# Patient Record
Sex: FEMALE | Race: BLACK OR AFRICAN AMERICAN | NOT HISPANIC OR LATINO | Employment: PART TIME | URBAN - METROPOLITAN AREA
[De-identification: names, ages, dates, MRNs, and addresses within clinical notes are randomized per-mention and may not be internally consistent; named-entity substitution may affect disease eponyms.]

---

## 2023-11-09 ENCOUNTER — OFFICE VISIT (OUTPATIENT)
Dept: PRIMARY CARE | Facility: CLINIC | Age: 33
End: 2023-11-09
Payer: MEDICAID

## 2023-11-09 VITALS
DIASTOLIC BLOOD PRESSURE: 70 MMHG | BODY MASS INDEX: 24.1 KG/M2 | SYSTOLIC BLOOD PRESSURE: 110 MMHG | WEIGHT: 136 LBS | TEMPERATURE: 97.8 F | OXYGEN SATURATION: 100 % | HEART RATE: 77 BPM | HEIGHT: 63 IN

## 2023-11-09 RX ORDER — HYDROCHLOROTHIAZIDE 25 MG/1
TABLET ORAL EVERY 24 HOURS
COMMUNITY
Start: 2015-03-03 | End: 2023-11-10 | Stop reason: SDUPTHER

## 2023-11-09 ASSESSMENT — PATIENT HEALTH QUESTIONNAIRE - PHQ9
1. LITTLE INTEREST OR PLEASURE IN DOING THINGS: NOT AT ALL
SUM OF ALL RESPONSES TO PHQ9 QUESTIONS 1 AND 2: 0
2. FEELING DOWN, DEPRESSED OR HOPELESS: NOT AT ALL

## 2023-11-09 ASSESSMENT — ENCOUNTER SYMPTOMS
DEPRESSION: 0
OCCASIONAL FEELINGS OF UNSTEADINESS: 0
LOSS OF SENSATION IN FEET: 0

## 2023-11-09 ASSESSMENT — PAIN SCALES - GENERAL: PAINLEVEL: 0-NO PAIN

## 2023-11-09 NOTE — PROGRESS NOTES
"Subjective   Patient ID: Mayuri Calhoun is a 33 y.o. female who presents for Establish Care.    HPI     Review of Systems    Objective   /70 (BP Location: Left arm, Patient Position: Sitting, BP Cuff Size: Small adult)   Pulse 77   Temp 36.6 °C (97.8 °F) (Oral)   Ht 1.6 m (5' 3\")   Wt 61.7 kg (136 lb)   LMP 10/10/2023 (Approximate)   SpO2 100%   BMI 24.09 kg/m²     Physical Exam    Assessment/Plan   {Assess/PlanSmartLinks:36556}       "

## 2023-11-10 ENCOUNTER — TELEPHONE (OUTPATIENT)
Dept: PRIMARY CARE | Facility: CLINIC | Age: 33
End: 2023-11-10
Payer: MEDICAID

## 2023-11-10 DIAGNOSIS — Z00.00 ROUTINE GENERAL MEDICAL EXAMINATION AT A HEALTH CARE FACILITY: ICD-10-CM

## 2023-11-10 DIAGNOSIS — I10 PRIMARY HYPERTENSION: Primary | ICD-10-CM

## 2023-11-10 RX ORDER — HYDROCHLOROTHIAZIDE 25 MG/1
25 TABLET ORAL EVERY 24 HOURS
Qty: 30 TABLET | Refills: 0 | Status: SHIPPED | OUTPATIENT
Start: 2023-11-10 | End: 2023-12-05

## 2023-12-05 DIAGNOSIS — Z00.00 ROUTINE GENERAL MEDICAL EXAMINATION AT A HEALTH CARE FACILITY: ICD-10-CM

## 2023-12-05 RX ORDER — HYDROCHLOROTHIAZIDE 25 MG/1
25 TABLET ORAL EVERY 24 HOURS
Qty: 20 TABLET | Refills: 0 | Status: SHIPPED | OUTPATIENT
Start: 2023-12-05 | End: 2024-01-30 | Stop reason: SDUPTHER

## 2023-12-19 ENCOUNTER — OFFICE VISIT (OUTPATIENT)
Dept: PRIMARY CARE | Facility: CLINIC | Age: 33
End: 2023-12-19
Payer: MEDICAID

## 2023-12-19 VITALS
BODY MASS INDEX: 23.49 KG/M2 | HEART RATE: 85 BPM | OXYGEN SATURATION: 99 % | SYSTOLIC BLOOD PRESSURE: 112 MMHG | DIASTOLIC BLOOD PRESSURE: 68 MMHG | WEIGHT: 132.6 LBS

## 2023-12-19 DIAGNOSIS — R63.4 WEIGHT LOSS: Primary | ICD-10-CM

## 2023-12-19 DIAGNOSIS — I10 PRIMARY HYPERTENSION: ICD-10-CM

## 2023-12-19 DIAGNOSIS — F20.9 SCHIZOPHRENIA, UNSPECIFIED TYPE (MULTI): ICD-10-CM

## 2023-12-19 PROCEDURE — 3078F DIAST BP <80 MM HG: CPT | Performed by: FAMILY MEDICINE

## 2023-12-19 PROCEDURE — 3074F SYST BP LT 130 MM HG: CPT | Performed by: FAMILY MEDICINE

## 2023-12-19 PROCEDURE — 3008F BODY MASS INDEX DOCD: CPT | Performed by: FAMILY MEDICINE

## 2023-12-19 PROCEDURE — 99214 OFFICE O/P EST MOD 30 MIN: CPT | Performed by: FAMILY MEDICINE

## 2023-12-19 RX ORDER — DIVALPROEX SODIUM 250 MG/1
250 TABLET, FILM COATED, EXTENDED RELEASE ORAL DAILY
COMMUNITY
Start: 2023-12-18 | End: 2024-03-05

## 2023-12-19 ASSESSMENT — PATIENT HEALTH QUESTIONNAIRE - PHQ9
2. FEELING DOWN, DEPRESSED OR HOPELESS: NOT AT ALL
SUM OF ALL RESPONSES TO PHQ9 QUESTIONS 1 AND 2: 0
1. LITTLE INTEREST OR PLEASURE IN DOING THINGS: NOT AT ALL

## 2023-12-19 ASSESSMENT — ENCOUNTER SYMPTOMS
LOSS OF SENSATION IN FEET: 0
DEPRESSION: 0
OCCASIONAL FEELINGS OF UNSTEADINESS: 0

## 2023-12-19 NOTE — PROGRESS NOTES
Subjective   Patient ID: Mayuri Calhoun is a 33 y.o. female who presents for Follow-up (BP check, neurologist, c/o tachycardia.).  Patient presents with daughter (?), after nearly 3 year absence.  Had been living in [place].  Got back Sept 2022.   Had been off of all medications.    Saw a new neurologist recently and put back on seizure medication.   She only endorses  taking divalproex currently.  Has had palpitations, and went to ED.  Was put on meds for that, but not taking.  Did restart her hydrochlorothiazide and  BP has been more  controlled.    Has lost a lot of weight.  Not sure why.  Was up to 201, and now 132 pounds.  Eating healthier.  Appetite is not good.  Doesn't sleep well.    Will get heart racing.  Can be any time.  Feels like she is going to have a seizure when it occurs.  Happens every day.    If not hungry, and eats will feel like going to throw for about 4-5 months.  Sometimes will have epigastric  pain if hasn't eaten.  Having pain RLQ pain. 2-3 days.    History of mental health problems.  Not currently taking medication.    Going  to [employer] school currently.        Review of Systems    Objective   /68 (BP Location: Right arm, Patient Position: Sitting, BP Cuff Size: Adult)   Pulse 85   Wt 60.1 kg (132 lb 9.6 oz)   SpO2 99%   BMI 23.49 kg/m²     Physical Exam  Vitals reviewed.   Constitutional:       Appearance: Normal appearance.   Cardiovascular:      Rate and Rhythm: Normal rate and regular rhythm.      Heart sounds: No murmur heard.  Pulmonary:      Effort: Pulmonary effort is normal.      Breath sounds: Normal breath sounds.   Abdominal:      General: Abdomen is flat.      Palpations: There is no mass.      Tenderness: There is abdominal tenderness (mild nonspecific). There is no guarding or rebound.   Musculoskeletal:      Right lower leg: No edema.      Left lower leg: No edema.   Neurological:      Mental Status: She is alert.           Assessment/Plan   Problem List Items  Addressed This Visit       Schizophrenia, unspecified type (CMS/HCC)    Hypertension    Relevant Orders    Lipid Panel     Other Visit Diagnoses       Weight loss    -  Primary    Relevant Orders    TSH with reflex to Free T4 if abnormal    CBC    Basic Metabolic Panel    Body mass index (BMI) 23.0-23.9, adult

## 2023-12-22 PROBLEM — F20.9 SCHIZOPHRENIA, UNSPECIFIED TYPE (MULTI): Status: ACTIVE | Noted: 2023-12-22

## 2023-12-22 NOTE — PATIENT INSTRUCTIONS
For BP, continue hydrochlorothiazide,  which has been keeping  BP down.  Monitor BP at home if able.  For high blood pressure:   Continue current medicines.                                                                                                Blood Pressure Treatment goals include:                                                                                    BP of 120/80, with no higher than 140/85 on consistent basis.                               Exercise at 150 minutes weekly.  Eat a balanced diet, rich in fruits and vegetables, low in sodium and processed foods.    Unintentional weight loss, with  some  intermittent nausea and abdominal pain.  Check  blood work to start  with.  See how  things go since back on medication.  Follow up in 1 month.  Will evaluate  further  based on results and symptoms.    Seizure disorder,  recently back on  divalproex.  Continue with  neurologist.

## 2024-01-23 ASSESSMENT — ENCOUNTER SYMPTOMS
PND: 0
ORTHOPNEA: 1
SWEATS: 0
HYPERTENSION: 1
BLURRED VISION: 1
HEADACHES: 1
PALPITATIONS: 1
SHORTNESS OF BREATH: 1
NECK PAIN: 1

## 2024-01-30 ENCOUNTER — OFFICE VISIT (OUTPATIENT)
Dept: PRIMARY CARE | Facility: CLINIC | Age: 34
End: 2024-01-30
Payer: MEDICAID

## 2024-01-30 VITALS
HEIGHT: 63 IN | SYSTOLIC BLOOD PRESSURE: 110 MMHG | BODY MASS INDEX: 23.35 KG/M2 | OXYGEN SATURATION: 99 % | HEART RATE: 82 BPM | TEMPERATURE: 98.5 F | DIASTOLIC BLOOD PRESSURE: 73 MMHG | WEIGHT: 131.8 LBS

## 2024-01-30 DIAGNOSIS — F20.9 SCHIZOPHRENIA, UNSPECIFIED TYPE (MULTI): ICD-10-CM

## 2024-01-30 DIAGNOSIS — R00.2 HEART PALPITATIONS: Primary | ICD-10-CM

## 2024-01-30 DIAGNOSIS — R11.2 NAUSEA AND VOMITING, UNSPECIFIED VOMITING TYPE: ICD-10-CM

## 2024-01-30 DIAGNOSIS — G40.804 EPILEPSY WITH BOTH GENERALIZED AND FOCAL FEATURES, INTRACTABLE (MULTI): ICD-10-CM

## 2024-01-30 DIAGNOSIS — I10 PRIMARY HYPERTENSION: ICD-10-CM

## 2024-01-30 DIAGNOSIS — R63.4 WEIGHT LOSS: ICD-10-CM

## 2024-01-30 DIAGNOSIS — Z00.00 ROUTINE GENERAL MEDICAL EXAMINATION AT A HEALTH CARE FACILITY: ICD-10-CM

## 2024-01-30 LAB
ALBUMIN SERPL BCP-MCNC: 4.6 G/DL (ref 3.4–5)
ALP SERPL-CCNC: 63 U/L (ref 33–110)
ALT SERPL W P-5'-P-CCNC: 26 U/L (ref 7–45)
ANION GAP SERPL CALC-SCNC: 11 MMOL/L (ref 10–20)
AST SERPL W P-5'-P-CCNC: 19 U/L (ref 9–39)
BILIRUB SERPL-MCNC: 0.4 MG/DL (ref 0–1.2)
BUN SERPL-MCNC: 13 MG/DL (ref 6–23)
CALCIUM SERPL-MCNC: 9.6 MG/DL (ref 8.6–10.6)
CHLORIDE SERPL-SCNC: 102 MMOL/L (ref 98–107)
CHOLEST SERPL-MCNC: 190 MG/DL (ref 0–199)
CHOLESTEROL/HDL RATIO: 4.1
CO2 SERPL-SCNC: 27 MMOL/L (ref 21–32)
CREAT SERPL-MCNC: 0.93 MG/DL (ref 0.5–1.05)
EGFRCR SERPLBLD CKD-EPI 2021: 83 ML/MIN/1.73M*2
ERYTHROCYTE [DISTWIDTH] IN BLOOD BY AUTOMATED COUNT: 14.1 % (ref 11.5–14.5)
GLUCOSE SERPL-MCNC: 90 MG/DL (ref 74–99)
HCT VFR BLD AUTO: 38.6 % (ref 36–46)
HDLC SERPL-MCNC: 46.2 MG/DL
HGB BLD-MCNC: 13.1 G/DL (ref 12–16)
LDLC SERPL CALC-MCNC: 129 MG/DL
MAGNESIUM SERPL-MCNC: 2.49 MG/DL (ref 1.6–2.4)
MCH RBC QN AUTO: 32.8 PG (ref 26–34)
MCHC RBC AUTO-ENTMCNC: 33.9 G/DL (ref 32–36)
MCV RBC AUTO: 97 FL (ref 80–100)
NON HDL CHOLESTEROL: 144 MG/DL (ref 0–149)
NRBC BLD-RTO: 0 /100 WBCS (ref 0–0)
PLATELET # BLD AUTO: 256 X10*3/UL (ref 150–450)
POTASSIUM SERPL-SCNC: 4.1 MMOL/L (ref 3.5–5.3)
PROT SERPL-MCNC: 7.1 G/DL (ref 6.4–8.2)
RBC # BLD AUTO: 4 X10*6/UL (ref 4–5.2)
SODIUM SERPL-SCNC: 136 MMOL/L (ref 136–145)
TRIGL SERPL-MCNC: 72 MG/DL (ref 0–149)
TSH SERPL-ACNC: 2.24 MIU/L (ref 0.44–3.98)
VLDL: 14 MG/DL (ref 0–40)
WBC # BLD AUTO: 7.2 X10*3/UL (ref 4.4–11.3)

## 2024-01-30 PROCEDURE — 3074F SYST BP LT 130 MM HG: CPT | Performed by: FAMILY MEDICINE

## 2024-01-30 PROCEDURE — 83735 ASSAY OF MAGNESIUM: CPT | Performed by: FAMILY MEDICINE

## 2024-01-30 PROCEDURE — 3008F BODY MASS INDEX DOCD: CPT | Performed by: FAMILY MEDICINE

## 2024-01-30 PROCEDURE — 99214 OFFICE O/P EST MOD 30 MIN: CPT | Performed by: FAMILY MEDICINE

## 2024-01-30 PROCEDURE — 84443 ASSAY THYROID STIM HORMONE: CPT | Performed by: FAMILY MEDICINE

## 2024-01-30 PROCEDURE — 3078F DIAST BP <80 MM HG: CPT | Performed by: FAMILY MEDICINE

## 2024-01-30 PROCEDURE — 85027 COMPLETE CBC AUTOMATED: CPT | Performed by: FAMILY MEDICINE

## 2024-01-30 PROCEDURE — 93000 ELECTROCARDIOGRAM COMPLETE: CPT | Performed by: FAMILY MEDICINE

## 2024-01-30 PROCEDURE — 80061 LIPID PANEL: CPT | Performed by: FAMILY MEDICINE

## 2024-01-30 PROCEDURE — 80053 COMPREHEN METABOLIC PANEL: CPT | Performed by: FAMILY MEDICINE

## 2024-01-30 RX ORDER — HYDROCHLOROTHIAZIDE 25 MG/1
25 TABLET ORAL EVERY 24 HOURS
Qty: 90 TABLET | Refills: 1 | Status: SHIPPED | OUTPATIENT
Start: 2024-01-30 | End: 2024-07-28

## 2024-01-30 ASSESSMENT — ENCOUNTER SYMPTOMS
PND: 0
HYPERTENSION: 1
HEADACHES: 1
OCCASIONAL FEELINGS OF UNSTEADINESS: 0
SWEATS: 0
ORTHOPNEA: 1
PALPITATIONS: 1
SHORTNESS OF BREATH: 1
NECK PAIN: 1
DEPRESSION: 0
BLURRED VISION: 1
LOSS OF SENSATION IN FEET: 0

## 2024-01-30 ASSESSMENT — ANXIETY QUESTIONNAIRES
6. BECOMING EASILY ANNOYED OR IRRITABLE: NOT AT ALL
IF YOU CHECKED OFF ANY PROBLEMS ON THIS QUESTIONNAIRE, HOW DIFFICULT HAVE THESE PROBLEMS MADE IT FOR YOU TO DO YOUR WORK, TAKE CARE OF THINGS AT HOME, OR GET ALONG WITH OTHER PEOPLE: NOT DIFFICULT AT ALL
1. FEELING NERVOUS, ANXIOUS, OR ON EDGE: SEVERAL DAYS
2. NOT BEING ABLE TO STOP OR CONTROL WORRYING: NOT AT ALL
7. FEELING AFRAID AS IF SOMETHING AWFUL MIGHT HAPPEN: NOT AT ALL
GAD7 TOTAL SCORE: 2
3. WORRYING TOO MUCH ABOUT DIFFERENT THINGS: NOT AT ALL
5. BEING SO RESTLESS THAT IT IS HARD TO SIT STILL: SEVERAL DAYS
4. TROUBLE RELAXING: NOT AT ALL

## 2024-01-30 ASSESSMENT — PATIENT HEALTH QUESTIONNAIRE - PHQ9
10. IF YOU CHECKED OFF ANY PROBLEMS, HOW DIFFICULT HAVE THESE PROBLEMS MADE IT FOR YOU TO DO YOUR WORK, TAKE CARE OF THINGS AT HOME, OR GET ALONG WITH OTHER PEOPLE: NOT DIFFICULT AT ALL
1. LITTLE INTEREST OR PLEASURE IN DOING THINGS: NOT AT ALL
SUM OF ALL RESPONSES TO PHQ9 QUESTIONS 1 AND 2: 1
2. FEELING DOWN, DEPRESSED OR HOPELESS: SEVERAL DAYS

## 2024-01-30 NOTE — PROGRESS NOTES
"Subjective   Patient ID: Mayuri Calhoun is a 33 y.o. female who presents for Follow-up.  Had seen neurologist recently.    Didn't get  blood work  done since last visit.    BP good  at  home.    Appetite is not good.  Weight 121 last week.  Doesn't eat much.  Sometimes feels like body just did a work  out, thinks due to not getting  enough  nutrition.  Will also make her throw up after eating.  Hasn't  seen gastroenterologist    Had  seizure the other day,  Had been over a year since she's had  one.  Will see neuro on Feb 12.    Has heart racing.  Has affected breathing when bad.  Took about 1.5 hours to  slow down.  The day before had the seizure, had been  racing all day.        Hypertension  This is a recurrent problem. The current episode started more than 1 year ago. The problem is unchanged. The problem is controlled. Associated symptoms include blurred vision, chest pain, headaches, malaise/fatigue, neck pain, orthopnea, palpitations and shortness of breath. Pertinent negatives include no peripheral edema, PND or sweats. Agents associated with hypertension include anorectics and NSAIDs. Risk factors for coronary artery disease include smoking/tobacco exposure and stress. There are no compliance problems.        Review of Systems   Constitutional:  Positive for malaise/fatigue.   Eyes:  Positive for blurred vision.   Respiratory:  Positive for shortness of breath.    Cardiovascular:  Positive for chest pain, palpitations and orthopnea. Negative for PND.   Musculoskeletal:  Positive for neck pain.   Neurological:  Positive for headaches.       Objective   /73   Pulse 82   Temp 36.9 °C (98.5 °F)   Ht 1.6 m (5' 3\")   Wt 59.8 kg (131 lb 12.8 oz)   LMP 01/29/2024   SpO2 99%   BMI 23.35 kg/m²     Physical Exam  Vitals reviewed.   Constitutional:       Appearance: Normal appearance.   Cardiovascular:      Rate and Rhythm: Normal rate and regular rhythm.      Heart sounds: No murmur heard.  Pulmonary:    "   Effort: Pulmonary effort is normal.      Breath sounds: Normal breath sounds.   Abdominal:      General: Abdomen is flat. There is no distension.      Palpations: There is no mass.      Tenderness: There is abdominal tenderness (epigastrium). There is no guarding or rebound.   Musculoskeletal:      Right lower leg: No edema.      Left lower leg: No edema.   Neurological:      Mental Status: She is alert.   Psychiatric:         Mood and Affect: Mood normal.         Behavior: Behavior normal.           Assessment/Plan   Problem List Items Addressed This Visit       Schizophrenia, unspecified type (CMS/HCC)    Epilepsy with both generalized and focal features, intractable (CMS/HCC)    Hypertension     Other Visit Diagnoses       Heart palpitations    -  Primary    Relevant Orders    ECG 12 Lead    Comprehensive Metabolic Panel    Magnesium    Holter or Event Cardiac Monitor    Nausea and vomiting, unspecified vomiting type        Relevant Orders    Referral to Gastroenterology    Weight loss

## 2024-01-30 NOTE — PATIENT INSTRUCTIONS
BP controlled.  Continue hydrochlorothiazide    Heart racing  frequently.  Check fasting blood work.  Check EKG today.  Heart monitor ordered.    Continue with neurologist for seizures.  Appointment scheduled  Feb. 12    Nausea and vomiting after eating.  Refer  to gastroenterologist

## 2024-03-05 ENCOUNTER — OFFICE VISIT (OUTPATIENT)
Dept: GASTROENTEROLOGY | Facility: HOSPITAL | Age: 34
End: 2024-03-05
Payer: MEDICAID

## 2024-03-05 VITALS
DIASTOLIC BLOOD PRESSURE: 71 MMHG | OXYGEN SATURATION: 99 % | HEIGHT: 63 IN | RESPIRATION RATE: 18 BRPM | SYSTOLIC BLOOD PRESSURE: 103 MMHG | TEMPERATURE: 97.4 F | WEIGHT: 129 LBS | BODY MASS INDEX: 22.86 KG/M2 | HEART RATE: 75 BPM

## 2024-03-05 DIAGNOSIS — K62.5 BRBPR (BRIGHT RED BLOOD PER RECTUM): ICD-10-CM

## 2024-03-05 DIAGNOSIS — R11.2 NAUSEA AND VOMITING, UNSPECIFIED VOMITING TYPE: ICD-10-CM

## 2024-03-05 DIAGNOSIS — R63.0 ANOREXIA: ICD-10-CM

## 2024-03-05 DIAGNOSIS — R68.81 EARLY SATIETY: ICD-10-CM

## 2024-03-05 DIAGNOSIS — K62.89 RECTAL DISCOMFORT: ICD-10-CM

## 2024-03-05 DIAGNOSIS — R13.19 ESOPHAGEAL DYSPHAGIA: Primary | ICD-10-CM

## 2024-03-05 DIAGNOSIS — R63.4 UNINTENTIONAL WEIGHT LOSS: ICD-10-CM

## 2024-03-05 DIAGNOSIS — R10.13 EPIGASTRIC PAIN: ICD-10-CM

## 2024-03-05 PROCEDURE — 3074F SYST BP LT 130 MM HG: CPT | Performed by: NURSE PRACTITIONER

## 2024-03-05 PROCEDURE — 3078F DIAST BP <80 MM HG: CPT | Performed by: NURSE PRACTITIONER

## 2024-03-05 PROCEDURE — 99214 OFFICE O/P EST MOD 30 MIN: CPT | Performed by: NURSE PRACTITIONER

## 2024-03-05 PROCEDURE — 3008F BODY MASS INDEX DOCD: CPT | Performed by: NURSE PRACTITIONER

## 2024-03-05 PROCEDURE — 99204 OFFICE O/P NEW MOD 45 MIN: CPT | Performed by: NURSE PRACTITIONER

## 2024-03-05 RX ORDER — CLONAZEPAM 0.5 MG/1
TABLET, ORALLY DISINTEGRATING ORAL
COMMUNITY
Start: 2024-02-13

## 2024-03-05 RX ORDER — ACETAMINOPHEN 500 MG
500 TABLET ORAL AS NEEDED
COMMUNITY

## 2024-03-05 RX ORDER — CARBAMAZEPINE 200 MG/1
200 CAPSULE, EXTENDED RELEASE ORAL 2 TIMES DAILY
COMMUNITY
Start: 2024-02-12

## 2024-03-05 RX ORDER — MIDAZOLAM 5 MG/.1ML
1 SPRAY NASAL
COMMUNITY
Start: 2024-02-12 | End: 2024-08-10

## 2024-03-05 RX ORDER — DIPHENHYDRAMINE HCL 25 MG
25 TABLET ORAL AS NEEDED
COMMUNITY

## 2024-03-05 RX ORDER — VALACYCLOVIR HYDROCHLORIDE 500 MG/1
TABLET, FILM COATED ORAL
COMMUNITY
Start: 2024-02-20

## 2024-03-05 RX ORDER — IBUPROFEN 800 MG/1
800 TABLET ORAL EVERY 8 HOURS PRN
COMMUNITY
Start: 2024-02-12

## 2024-03-05 RX ORDER — CLOTRIMAZOLE AND BETAMETHASONE DIPROPIONATE 10; .64 MG/G; MG/G
CREAM TOPICAL
COMMUNITY
Start: 2024-02-20 | End: 2024-03-12

## 2024-03-05 RX ORDER — POLYETHYLENE GLYCOL 3350 17 G/17G
238 POWDER, FOR SOLUTION ORAL ONCE
Qty: 238 G | Refills: 0 | Status: SHIPPED | OUTPATIENT
Start: 2024-03-05 | End: 2024-03-05

## 2024-03-05 SDOH — ECONOMIC STABILITY: FOOD INSECURITY: WITHIN THE PAST 12 MONTHS, THE FOOD YOU BOUGHT JUST DIDN'T LAST AND YOU DIDN'T HAVE MONEY TO GET MORE.: NEVER TRUE

## 2024-03-05 SDOH — ECONOMIC STABILITY: FOOD INSECURITY: WITHIN THE PAST 12 MONTHS, YOU WORRIED THAT YOUR FOOD WOULD RUN OUT BEFORE YOU GOT MONEY TO BUY MORE.: NEVER TRUE

## 2024-03-05 ASSESSMENT — ENCOUNTER SYMPTOMS
HEMATURIA: 0
NERVOUS/ANXIOUS: 0
FREQUENCY: 0
JOINT SWELLING: 0
WHEEZING: 0
FLANK PAIN: 0
COUGH: 0
DIAPHORESIS: 0
WEAKNESS: 0
AGITATION: 0
HEADACHES: 0
SORE THROAT: 0
BACK PAIN: 0
CHILLS: 0
DIZZINESS: 0
SHORTNESS OF BREATH: 0
LIGHT-HEADEDNESS: 0
HALLUCINATIONS: 0
NUMBNESS: 0
PHOTOPHOBIA: 0
DYSURIA: 0
ADENOPATHY: 0
MYALGIAS: 0
FEVER: 0
FATIGUE: 0
ARTHRALGIAS: 0
EYE PAIN: 0

## 2024-03-05 ASSESSMENT — PAIN SCALES - GENERAL: PAINLEVEL: 0-NO PAIN

## 2024-03-05 NOTE — PATIENT INSTRUCTIONS
Thanks for coming to the GI clinic.    I would like you to get an EGD.    I would like you to get a colonoscopy.   Please read all of the instructions 7 days before your colonoscopy.   You will need to take ONLY clear liquids the ENTIRE day before your procedure. These include (clear fruit juices, soda, Gatorade, broth, jello and coffee/tea) Avoid red and purple drinks. No cream or milk in the coffee.   You will need to take a bowel preparation.   You will also need a .      Please call [phone] to schedule the above procedures.     Follow up in clinic 3 weeks after completion of the above procedures.

## 2024-03-05 NOTE — PROGRESS NOTES
Subjective   Patient ID: Mayuri Calhoun is a 34 y.o. female who presents for New Patient Visit (Pt here today with epigastric pain).    This is a 34 year old AAF with history of tobacco/marijuana use, schizophrenia, bipolar disorder, epilepsy, HTN, postpartum cardiomyopathy, HSV, migraine headaches, and Bell's palsy who is presenting to the GI clinic for an initial visit.     History per pt, daughter, and review of EMR including OSH records    Pt is accompanied to this visit by her daughter    Reports since 8/2023 she's been having epigastric abdominal pain which is cramping and stabbing in nature. The pain sometimes radiates up to her chest and throat when she drinks Pepsi. The pain can last all day long.     Reports nausea/vomiting if she consumes a lot of food in one sitting, but this is not often as she's been getting full quickly.     She endorses a poor appetite.     Reports a roughly 50 lb unintentional weight loss in the past 3 months.     ROS positive for esophageal dysphagia to solids.  Drinking water somewhat helps to relieve the dysphagia.     Reports for almost a year she's been having intermittent BRBPR. She passes blood per rectum with defecation. Sometimes when she urinates, though, she will pass blood per rectum. She sees blood on the toilet paper and mixed into her stools.     She endorses occasional rectal discomfort.     She has 1-2 bowel movements per week which is her norm.     Denies odynophagia, diarrhea, constipation, hematemesis, or melena.       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 She has the Ziopatch in place today.         Review of Systems   Constitutional:  Negative for chills, diaphoresis, fatigue and fever.   HENT:  Negative for congestion, ear pain, hearing loss, sneezing and sore throat.    Eyes:  Negative for photophobia, pain and visual disturbance.   Respiratory:  Negative for cough, shortness of breath and wheezing.    Cardiovascular:  Negative for chest pain and leg swelling.   Endocrine: Negative for cold intolerance and heat intolerance.   Genitourinary:  Negative for dysuria, flank pain, frequency and hematuria.   Musculoskeletal:  Negative for arthralgias, back pain, gait problem, joint swelling and myalgias.   Skin:  Negative for rash.   Neurological:  Negative for dizziness, syncope, weakness, light-headedness, numbness and headaches.   Hematological:  Negative for adenopathy.   Psychiatric/Behavioral:  Negative for agitation and hallucinations. The patient is not nervous/anxious.        No Known Allergies    Current Outpatient Medications   Medication Sig Dispense Refill    carBAMazepine (Carbatrol) 200 mg 12 hr capsule Take 1 capsule (200 mg) by mouth 2 times a day.      clonazePAM (KlonoPIN) 0.5 mg disintegrating tablet TAKE 1 TABLET BY MOUTH TWO TIMES A DAY AS NEEDED (FOR SEIZURE WARNING) FOR UP  DAYS.      clotrimazole-betamethasone (Lotrisone) cream APPLY 1 APPLICATION TO AFFECTED AREA TWO TIMES A DAY.      hydroCHLOROthiazide (HYDRODiuril) 25 mg tablet Take 1 tablet (25 mg) by mouth once every 24 hours. 90 tablet 1    ibuprofen 800 mg tablet Take 1 tablet (800 mg) by mouth every 8 hours if needed.      nasal spray Nayzilam 5 mg/spray (0.1 mL) spray,non-aerosol Administer 1 spray into affected nostril(s).      valACYclovir (Valtrex) 500 mg tablet  "TAKE 1 TABLET BY MOUTH TWICE A DAY FOR 3 DAYS      acetaminophen (Tylenol) 500 mg tablet Take 1 tablet (500 mg) by mouth if needed for mild pain (1 - 3).      diphenhydrAMINE (Sominex) 25 mg tablet Take 1 tablet (25 mg) by mouth if needed for sleep or itching.       No current facility-administered medications for this visit.        Objective     /71   Pulse 75   Temp 36.3 °C (97.4 °F)   Resp 18   Ht 1.6 m (5' 3\")   Wt 58.5 kg (129 lb)   SpO2 99%   BMI 22.85 kg/m²     Physical Exam  Constitutional:       General: She is not in acute distress.     Appearance: Normal appearance.   HENT:      Head: Normocephalic and atraumatic.   Eyes:      Conjunctiva/sclera: Conjunctivae normal.   Cardiovascular:      Rate and Rhythm: Normal rate and regular rhythm.      Heart sounds: No murmur heard.     No gallop.   Pulmonary:      Effort: Pulmonary effort is normal.      Breath sounds: Normal breath sounds.   Abdominal:      General: Bowel sounds are normal. There is no distension.      Tenderness: There is no abdominal tenderness. There is no guarding.   Musculoskeletal:         General: No swelling or deformity. Normal range of motion.      Cervical back: Normal range of motion. No rigidity.   Skin:     General: Skin is warm and dry.      Coloration: Skin is not jaundiced.      Findings: No lesion or rash.   Neurological:      General: No focal deficit present.      Mental Status: She is alert and oriented to person, place, and time.   Psychiatric:         Mood and Affect: Mood normal.         Assessment/Plan   Problem List Items Addressed This Visit    None  Visit Diagnoses       Esophageal dysphagia    -  Primary    Relevant Orders    EGD    Nausea and vomiting, unspecified vomiting type        Relevant Orders    EGD    Early satiety        Relevant Orders    EGD    BRBPR (bright red blood per rectum)        Relevant Orders    Colonoscopy Diagnostic    Unintentional weight loss        Relevant Orders    " Colonoscopy Diagnostic    EGD    Epigastric pain        Relevant Orders    EGD    Anorexia        Relevant Orders    EGD    Rectal discomfort               Epigastric pain occasionally radiating to chest and throat, esophageal dysphagia to solids, early satiety, anorexia, nausea/vomiting: suspect GERD to be at least partially contributing. Also consider esophagitis, peptic stricture, esophageal web/ring, and malignancy with the latter less likely. I question if habitual marijuana use may be playing some role in her symptomatology and I discussed this with pt.   - will proceed with EGD  - recommend tobacco cessation   - discuss GERD dietary precautions     2. BRBPR, rectal discomfort: consider hemorrhoids, anal fissure, IBD, and colorectal cancer  - will proceed with colonoscopy   - Miralax Gatorade split bowel prep     3. Unintentional weight loss:   - work up as above    4. Follow up:  - return to clinic 3 weeks after completion of the above

## 2024-03-12 ENCOUNTER — APPOINTMENT (OUTPATIENT)
Dept: GASTROENTEROLOGY | Facility: EXTERNAL LOCATION | Age: 34
End: 2024-03-12
Payer: MEDICAID

## 2024-03-12 ENCOUNTER — OFFICE VISIT (OUTPATIENT)
Dept: GASTROENTEROLOGY | Facility: CLINIC | Age: 34
End: 2024-03-12
Payer: MEDICAID

## 2024-03-12 VITALS — HEART RATE: 86 BPM | HEIGHT: 63 IN | WEIGHT: 129 LBS | BODY MASS INDEX: 22.86 KG/M2 | OXYGEN SATURATION: 99 %

## 2024-03-12 DIAGNOSIS — R63.4 WEIGHT LOSS: ICD-10-CM

## 2024-03-12 DIAGNOSIS — K92.1 HEMATOCHEZIA: ICD-10-CM

## 2024-03-12 DIAGNOSIS — R19.8 ALTERED BOWEL FUNCTION: ICD-10-CM

## 2024-03-12 DIAGNOSIS — R10.13 EPIGASTRIC PAIN: Primary | ICD-10-CM

## 2024-03-12 DIAGNOSIS — R11.2 NAUSEA AND VOMITING, UNSPECIFIED VOMITING TYPE: ICD-10-CM

## 2024-03-12 DIAGNOSIS — R13.19 ESOPHAGEAL DYSPHAGIA: ICD-10-CM

## 2024-03-12 PROCEDURE — 3008F BODY MASS INDEX DOCD: CPT | Performed by: STUDENT IN AN ORGANIZED HEALTH CARE EDUCATION/TRAINING PROGRAM

## 2024-03-12 PROCEDURE — 99204 OFFICE O/P NEW MOD 45 MIN: CPT | Performed by: STUDENT IN AN ORGANIZED HEALTH CARE EDUCATION/TRAINING PROGRAM

## 2024-03-12 RX ORDER — PANTOPRAZOLE SODIUM 40 MG/1
40 TABLET, DELAYED RELEASE ORAL DAILY
Qty: 30 TABLET | Refills: 11 | Status: SHIPPED | OUTPATIENT
Start: 2024-03-12 | End: 2025-03-12

## 2024-03-12 ASSESSMENT — ENCOUNTER SYMPTOMS
UNEXPECTED WEIGHT CHANGE: 1
VOMITING: 1
TROUBLE SWALLOWING: 1
CHILLS: 0
BLOOD IN STOOL: 1
SHORTNESS OF BREATH: 0
FEVER: 0
NAUSEA: 0
ABDOMINAL DISTENTION: 0
DIARRHEA: 0
CONSTIPATION: 0
RECTAL PAIN: 0
ANAL BLEEDING: 1
COLOR CHANGE: 0
ABDOMINAL PAIN: 1

## 2024-03-12 NOTE — H&P (VIEW-ONLY)
Chief Complaint:  Chief Complaint   Patient presents with    Abdominal Pain    Nausea    Vomiting       Patient was scheduled for EGD/Colon today as a direct but cancelled per anesthesia given medical hx. Patient is here with daughter.    Hx of brain surgery? 2012 for seizures, issues w memory since then. Reports epilepsy,last seizures last month. 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    GI sxs for a year.  Per chart review saw GI-NP at  3/5/2024 and was scheduled for EGD/COLON.    Seeing cardiology for palpitations. Had Holter monitor placed recently mailed back and had cards f.up April 8th. ECHO was ordered but not completed.  No recent ECHO     Regarding GI sxs, chronic ~ 1 yr    Epigastric pain for months, feels tight and tender to touch. Avoids eating due to pain.   Nausea after eating with intermittent vomiting of food material   Poor appetite  Weight fluctuates between 10lbs when due to pain and food avoidance.   Solid food dysphagia  Intermittent chest burning    BM three times a week  Newborn 1-5    Sometimes BM relieves pain epigastric pain    Intermittent hematochezia when wiping, 8 times over month,pain with defecation  Reports hx of hemorrhoids    Fruit: 2-4  Veggies: 3  Whole Grain: 3-4  Water: 4 or 5 bottles daily, otherwise, Pepsi and coffee    Tobacco and Marijuana daily, marijuana helps with seizure prevention      at [employer]  In school for nutrition        No prior EGD/Colon    Review of Systems   Constitutional:  Positive for unexpected weight change. Negative for chills and fever.   HENT:  Positive for trouble swallowing.    Respiratory:  Negative for shortness of breath.    Cardiovascular:  Negative for chest pain.   Gastrointestinal:  Positive for abdominal pain, anal bleeding, blood in stool and vomiting. Negative for abdominal distention, constipation, diarrhea, nausea and rectal pain.   Skin:   "Negative for color change.     No family history on file.    Medications    Current Outpatient Medications:     acetaminophen (Tylenol) 500 mg tablet, Take 1 tablet (500 mg) by mouth if needed for mild pain (1 - 3)., Disp: , Rfl:     carBAMazepine (Carbatrol) 200 mg 12 hr capsule, Take 1 capsule (200 mg) by mouth 2 times a day., Disp: , Rfl:     clonazePAM (KlonoPIN) 0.5 mg disintegrating tablet, TAKE 1 TABLET BY MOUTH TWO TIMES A DAY AS NEEDED (FOR SEIZURE WARNING) FOR UP  DAYS., Disp: , Rfl:     diphenhydrAMINE (Sominex) 25 mg tablet, Take 1 tablet (25 mg) by mouth if needed for sleep or itching., Disp: , Rfl:     hydroCHLOROthiazide (HYDRODiuril) 25 mg tablet, Take 1 tablet (25 mg) by mouth once every 24 hours., Disp: 90 tablet, Rfl: 1    ibuprofen 800 mg tablet, Take 1 tablet (800 mg) by mouth every 8 hours if needed., Disp: , Rfl:     nasal spray Nayzilam 5 mg/spray (0.1 mL) spray,non-aerosol, Administer 1 spray into affected nostril(s)., Disp: , Rfl:     valACYclovir (Valtrex) 500 mg tablet, TAKE 1 TABLET BY MOUTH TWICE A DAY FOR 3 DAYS, Disp: , Rfl:     pantoprazole (ProtoNix) 40 mg EC tablet, Take 1 tablet (40 mg) by mouth once daily. 30 min before 1st meal. Do not crush, chew, or split., Disp: 30 tablet, Rfl: 11    Vitals  Pulse 86   Ht 1.6 m (5' 3\")   Wt 58.5 kg (129 lb)   SpO2 99%   BMI 22.85 kg/m²     Physical Exam  Constitutional:       General: She is not in acute distress.  HENT:      Head: Normocephalic and atraumatic.   Eyes:      General: No scleral icterus.     Conjunctiva/sclera: Conjunctivae normal.   Cardiovascular:      Rate and Rhythm: Normal rate.      Heart sounds: Normal heart sounds.   Pulmonary:      Effort: Pulmonary effort is normal.      Breath sounds: No wheezing.   Abdominal:      General: Bowel sounds are normal. There is no distension.      Palpations: Abdomen is soft.      Tenderness: There is no abdominal tenderness. There is no guarding or rebound.      Hernia: No " "hernia is present.   Skin:     Coloration: Skin is not jaundiced.   Neurological:      Mental Status: She is alert and oriented to person, place, and time.   Psychiatric:         Mood and Affect: Mood normal.           Labs:  No results found for: \"AFP\"No results found for: \"ASMAB\", \"MITOAB\"No results found for: \"MORGAN\"No results found for: \"ASMAB\", \"MITOAB\"No results found for: \"KJRVLXOX62\"No results found for: \"HEPCAB\"No results found for: \"HEPATOT\", \"HEPAIGM\", \"HEPBCIGM\", \"HEPBCAB\", \"HBEAG\", \"HEPCAB\"No results found for: \"HIV1X2\"No results found for: \"IRON\", \"TIBC\", \"FERRITIN\"  Lab Results   Component Value Date    INR 1.1 10/14/2019    PROTIME 12.6 10/14/2019     Lab Results   Component Value Date    TSH 2.24 01/30/2024       Radiology  ECG 12 Lead  NSR, no abnormality      A/P   Mayuri was seen today for abdominal pain, nausea and vomiting.  Diagnoses and all orders for this visit:  Epigastric pain (Primary)  -     EGD; Future  -     pantoprazole (ProtoNix) 40 mg EC tablet; Take 1 tablet (40 mg) by mouth once daily. 30 min before 1st meal. Do not crush, chew, or split.  Weight loss  -     EGD; Future  -     Colonoscopy Diagnostic; Future  Nausea and vomiting, unspecified vomiting type  -     EGD; Future  Esophageal dysphagia  -     EGD; Future  Hematochezia  -     Colonoscopy Diagnostic; Future  Altered bowel function     Problem List Items Addressed This Visit             ICD-10-CM    Epigastric pain - Primary R10.13     Likely multifactorial  - EGD/COLON         Relevant Medications    pantoprazole (ProtoNix) 40 mg EC tablet    Other Relevant Orders    EGD    Nausea & vomiting R11.2     Unclear etiology, could be related to constipation vs gastroparesis vs delayed gastric emptying 2/2 marijuana vs GERD vs functional dyspepsia  - limit marijuana use  - start PPI  - tx constipation with fiber and Miralax  - consider solid phase GES but patient reports allergy to eggs         Relevant Orders    EGD    Esophageal " dysphagia R13.19     Recommend EGD for furthe eval, discussed plan for esophageal bx and possible dilation   - R/B/A of procedure discussed w patient including but not limited to risk of bleeding, infection, perforation - patient agreeable to EGD   - no medications need adjusting   - start PPI QD  - pre and post procedure instructions discussed          Relevant Orders    EGD    Hematochezia K92.1     Discussed possible etiologies of rectal bleeding including hemorrhoids, diverituclosis, AVMS, IBD, polyps or masses such as colorectal cancer   - R/B/A of procedure discussed w patient including but not limited to risk of bleeding, infection, missed polyps, perforation, larger polyps which can not be removed endoscopically or by myself, incomplete procedure due to looping or preparation; benefits to detect and remove pre-cancerous polyps and alternatives such as virtual colon or stool based testing   - patient agreeable to colonoscopy --> plan for Melina with PAT  - split dose Miralax/Gatorade  - low fiber diet 3 days before   - hold fiber supplement 5 days before  - hold hydrochlorothiazide am of scope  - given ongoing cardiac w.up and recent seizure will request medical optimization from cards and neuro  - pre and post procedure instructions discussed in detail by myself and  with hand outs provided           Relevant Orders    Colonoscopy Diagnostic    Altered bowel function R19.8     BM three times weekly, Graham 1-5.  Good fiber and water intake. Possibly slow transit.   - high fiber diet discussed with hand out provided   - start fiber supplement: Metamucil, Benefiber or Citrucel generic; take 1 tbsp/1 gummies/1 capsules daily for one week with adequate water, if no improvement in bowel consistency after one week increase to two and so on. Must take daily   - Miralax PRN - three times weekly           Other Visit Diagnoses         Codes    Weight loss     R63.4    Relevant Orders    EGD     Colonoscopy Diagnostic

## 2024-03-12 NOTE — ASSESSMENT & PLAN NOTE
Recommend EGD for furthe eval, discussed plan for esophageal bx and possible dilation   - R/B/A of procedure discussed w patient including but not limited to risk of bleeding, infection, perforation - patient agreeable to EGD   - no medications need adjusting   - start PPI QD  - pre and post procedure instructions discussed

## 2024-03-12 NOTE — PROGRESS NOTES
Chief Complaint:  Chief Complaint   Patient presents with    Abdominal Pain    Nausea    Vomiting       Patient was scheduled for EGD/Colon today as a direct but cancelled per anesthesia given medical hx. Patient is here with daughter.    Hx of brain surgery? 2012 for seizures, issues w memory since then. Reports epilepsy,last seizures last month. 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    GI sxs for a year.  Per chart review saw GI-NP at  3/5/2024 and was scheduled for EGD/COLON.    Seeing cardiology for palpitations. Had Holter monitor placed recently mailed back and had cards f.up April 8th. ECHO was ordered but not completed.  No recent ECHO     Regarding GI sxs, chronic ~ 1 yr    Epigastric pain for months, feels tight and tender to touch. Avoids eating due to pain.   Nausea after eating with intermittent vomiting of food material   Poor appetite  Weight fluctuates between 10lbs when due to pain and food avoidance.   Solid food dysphagia  Intermittent chest burning    BM three times a week  [place] 1-5    Sometimes BM relieves pain epigastric pain    Intermittent hematochezia when wiping, 8 times over month,pain with defecation  Reports hx of hemorrhoids    Fruit: 2-4  Veggies: 3  Whole Grain: 3-4  Water: 4 or 5 bottles daily, otherwise, [employer]eps[employer] and coffee    Tobacco and Marijuana daily, marijuana helps with seizure prevention      at [employer]  In school for nutrition        No prior EGD/Colon    Review of Systems   Constitutional:  Positive for unexpected weight change. Negative for chills and fever.   HENT:  Positive for trouble swallowing.    Respiratory:  Negative for shortness of breath.    Cardiovascular:  Negative for chest pain.   Gastrointestinal:  Positive for abdominal pain, anal bleeding, blood in stool and vomiting. Negative for abdominal distention, constipation, diarrhea, nausea and rectal pain.   Skin:   "Negative for color change.     No family history on file.    Medications    Current Outpatient Medications:     acetaminophen (Tylenol) 500 mg tablet, Take 1 tablet (500 mg) by mouth if needed for mild pain (1 - 3)., Disp: , Rfl:     carBAMazepine (Carbatrol) 200 mg 12 hr capsule, Take 1 capsule (200 mg) by mouth 2 times a day., Disp: , Rfl:     clonazePAM (KlonoPIN) 0.5 mg disintegrating tablet, TAKE 1 TABLET BY MOUTH TWO TIMES A DAY AS NEEDED (FOR SEIZURE WARNING) FOR UP  DAYS., Disp: , Rfl:     diphenhydrAMINE (Sominex) 25 mg tablet, Take 1 tablet (25 mg) by mouth if needed for sleep or itching., Disp: , Rfl:     hydroCHLOROthiazide (HYDRODiuril) 25 mg tablet, Take 1 tablet (25 mg) by mouth once every 24 hours., Disp: 90 tablet, Rfl: 1    ibuprofen 800 mg tablet, Take 1 tablet (800 mg) by mouth every 8 hours if needed., Disp: , Rfl:     nasal spray Nayzilam 5 mg/spray (0.1 mL) spray,non-aerosol, Administer 1 spray into affected nostril(s)., Disp: , Rfl:     valACYclovir (Valtrex) 500 mg tablet, TAKE 1 TABLET BY MOUTH TWICE A DAY FOR 3 DAYS, Disp: , Rfl:     pantoprazole (ProtoNix) 40 mg EC tablet, Take 1 tablet (40 mg) by mouth once daily. 30 min before 1st meal. Do not crush, chew, or split., Disp: 30 tablet, Rfl: 11    Vitals  Pulse 86   Ht 1.6 m (5' 3\")   Wt 58.5 kg (129 lb)   SpO2 99%   BMI 22.85 kg/m²     Physical Exam  Constitutional:       General: She is not in acute distress.  HENT:      Head: Normocephalic and atraumatic.   Eyes:      General: No scleral icterus.     Conjunctiva/sclera: Conjunctivae normal.   Cardiovascular:      Rate and Rhythm: Normal rate.      Heart sounds: Normal heart sounds.   Pulmonary:      Effort: Pulmonary effort is normal.      Breath sounds: No wheezing.   Abdominal:      General: Bowel sounds are normal. There is no distension.      Palpations: Abdomen is soft.      Tenderness: There is no abdominal tenderness. There is no guarding or rebound.      Hernia: No " "hernia is present.   Skin:     Coloration: Skin is not jaundiced.   Neurological:      Mental Status: She is alert and oriented to person, place, and time.   Psychiatric:         Mood and Affect: Mood normal.           Labs:  No results found for: \"AFP\"No results found for: \"ASMAB\", \"MITOAB\"No results found for: \"MORGAN\"No results found for: \"ASMAB\", \"MITOAB\"No results found for: \"DYZHLVIL74\"No results found for: \"HEPCAB\"No results found for: \"HEPATOT\", \"HEPAIGM\", \"HEPBCIGM\", \"HEPBCAB\", \"HBEAG\", \"HEPCAB\"No results found for: \"HIV1X2\"No results found for: \"IRON\", \"TIBC\", \"FERRITIN\"  Lab Results   Component Value Date    INR 1.1 10/14/2019    PROTIME 12.6 10/14/2019     Lab Results   Component Value Date    TSH 2.24 01/30/2024       Radiology  ECG 12 Lead  NSR, no abnormality      A/P   Mayuri was seen today for abdominal pain, nausea and vomiting.  Diagnoses and all orders for this visit:  Epigastric pain (Primary)  -     EGD; Future  -     pantoprazole (ProtoNix) 40 mg EC tablet; Take 1 tablet (40 mg) by mouth once daily. 30 min before 1st meal. Do not crush, chew, or split.  Weight loss  -     EGD; Future  -     Colonoscopy Diagnostic; Future  Nausea and vomiting, unspecified vomiting type  -     EGD; Future  Esophageal dysphagia  -     EGD; Future  Hematochezia  -     Colonoscopy Diagnostic; Future  Altered bowel function     Problem List Items Addressed This Visit             ICD-10-CM    Epigastric pain - Primary R10.13     Likely multifactorial  - EGD/COLON         Relevant Medications    pantoprazole (ProtoNix) 40 mg EC tablet    Other Relevant Orders    EGD    Nausea & vomiting R11.2     Unclear etiology, could be related to constipation vs gastroparesis vs delayed gastric emptying 2/2 marijuana vs GERD vs functional dyspepsia  - limit marijuana use  - start PPI  - tx constipation with fiber and Miralax  - consider solid phase GES but patient reports allergy to eggs         Relevant Orders    EGD    Esophageal " dysphagia R13.19     Recommend EGD for furthe eval, discussed plan for esophageal bx and possible dilation   - R/B/A of procedure discussed w patient including but not limited to risk of bleeding, infection, perforation - patient agreeable to EGD   - no medications need adjusting   - start PPI QD  - pre and post procedure instructions discussed          Relevant Orders    EGD    Hematochezia K92.1     Discussed possible etiologies of rectal bleeding including hemorrhoids, diverituclosis, AVMS, IBD, polyps or masses such as colorectal cancer   - R/B/A of procedure discussed w patient including but not limited to risk of bleeding, infection, missed polyps, perforation, larger polyps which can not be removed endoscopically or by myself, incomplete procedure due to looping or preparation; benefits to detect and remove pre-cancerous polyps and alternatives such as virtual colon or stool based testing   - patient agreeable to colonoscopy --> plan for Melina with PAT  - split dose Miralax/Gatorade  - low fiber diet 3 days before   - hold fiber supplement 5 days before  - hold hydrochlorothiazide am of scope  - given ongoing cardiac w.up and recent seizure will request medical optimization from cards and neuro  - pre and post procedure instructions discussed in detail by myself and  with hand outs provided           Relevant Orders    Colonoscopy Diagnostic    Altered bowel function R19.8     BM three times weekly, [place] 1-5.  Good fiber and water intake. Possibly slow transit.   - high fiber diet discussed with hand out provided   - start fiber supplement: Metamucil, Benefiber or Citrucel generic; take 1 tbsp/1 gummies/1 capsules daily for one week with adequate water, if no improvement in bowel consistency after one week increase to two and so on. Must take daily   - Miralax PRN - three times weekly           Other Visit Diagnoses         Codes    Weight loss     R63.4    Relevant Orders    EGD     Colonoscopy Diagnostic

## 2024-03-13 NOTE — PATIENT INSTRUCTIONS
For Miralax and Fiber    Take 1 capful/1 packet of Miralax at night with 8oz of liquid. Take three times over 7 days for the 1st week. May have some loose stool/diarrhea as the bowels are emptied. Can adjust the dose of Miralax as needed to obtain the preferred stool consistency and frequency.    Start a fiber supplement daily. Suggest Benefiber or Cirtrucel. Start low and increase dose slow. Start with 1 tsp/ 1 capsule/ 1 gummy of fiber each day. Must drink atleast 8 cups of water throughout the day. Can increase the dose of fiber after 1 week of daily use. It is important to take this DAILY.     Fiber is what helps keep stool moving throughout the colon and keeps stool from being too hard or too loose!

## 2024-03-13 NOTE — ASSESSMENT & PLAN NOTE
Unclear etiology, could be related to constipation vs gastroparesis vs delayed gastric emptying 2/2 marijuana vs GERD vs functional dyspepsia  - limit marijuana use  - start PPI  - tx constipation with fiber and Miralax  - consider solid phase GES but patient reports allergy to eggs

## 2024-03-13 NOTE — ASSESSMENT & PLAN NOTE
Discussed possible etiologies of rectal bleeding including hemorrhoids, diverituclosis, AVMS, IBD, polyps or masses such as colorectal cancer   - R/B/A of procedure discussed w patient including but not limited to risk of bleeding, infection, missed polyps, perforation, larger polyps which can not be removed endoscopically or by myself, incomplete procedure due to looping or preparation; benefits to detect and remove pre-cancerous polyps and alternatives such as virtual colon or stool based testing   - patient agreeable to colonoscopy --> plan for Melina with PAT  - split dose Miralax/Gatorade  - low fiber diet 3 days before   - hold fiber supplement 5 days before  - hold hydrochlorothiazide am of scope  - given ongoing cardiac w.up and recent seizure will request medical optimization from cards and neuro  - pre and post procedure instructions discussed in detail by myself and  with hand outs provided

## 2024-03-13 NOTE — ASSESSMENT & PLAN NOTE
BM three times weekly, [place] 1-5.  Good fiber and water intake. Possibly slow transit.   - high fiber diet discussed with hand out provided   - start fiber supplement: Metamucil, Benefiber or Citrucel generic; take 1 tbsp/1 gummies/1 capsules daily for one week with adequate water, if no improvement in bowel consistency after one week increase to two and so on. Must take daily   - Miralax PRN - three times weekly

## 2024-04-01 ENCOUNTER — ANESTHESIA (OUTPATIENT)
Dept: GASTROENTEROLOGY | Facility: HOSPITAL | Age: 34
End: 2024-04-01
Payer: MEDICAID

## 2024-04-01 ENCOUNTER — ANESTHESIA EVENT (OUTPATIENT)
Dept: GASTROENTEROLOGY | Facility: HOSPITAL | Age: 34
End: 2024-04-01
Payer: MEDICAID

## 2024-04-01 ENCOUNTER — HOSPITAL ENCOUNTER (OUTPATIENT)
Dept: GASTROENTEROLOGY | Facility: HOSPITAL | Age: 34
Setting detail: OUTPATIENT SURGERY
Discharge: HOME | End: 2024-04-01
Payer: MEDICAID

## 2024-04-01 VITALS
HEART RATE: 65 BPM | OXYGEN SATURATION: 100 % | TEMPERATURE: 97.3 F | RESPIRATION RATE: 16 BRPM | BODY MASS INDEX: 22.42 KG/M2 | WEIGHT: 126.54 LBS | HEIGHT: 63 IN | SYSTOLIC BLOOD PRESSURE: 102 MMHG | DIASTOLIC BLOOD PRESSURE: 68 MMHG

## 2024-04-01 DIAGNOSIS — R10.13 EPIGASTRIC PAIN: ICD-10-CM

## 2024-04-01 DIAGNOSIS — R63.4 WEIGHT LOSS: ICD-10-CM

## 2024-04-01 DIAGNOSIS — K92.1 HEMATOCHEZIA: ICD-10-CM

## 2024-04-01 DIAGNOSIS — R11.2 NAUSEA AND VOMITING, UNSPECIFIED VOMITING TYPE: ICD-10-CM

## 2024-04-01 DIAGNOSIS — R13.19 ESOPHAGEAL DYSPHAGIA: ICD-10-CM

## 2024-04-01 LAB — PREGNANCY TEST URINE, POC: NEGATIVE

## 2024-04-01 PROCEDURE — 45380 COLONOSCOPY AND BIOPSY: CPT | Performed by: STUDENT IN AN ORGANIZED HEALTH CARE EDUCATION/TRAINING PROGRAM

## 2024-04-01 PROCEDURE — 3700000001 HC GENERAL ANESTHESIA TIME - INITIAL BASE CHARGE

## 2024-04-01 PROCEDURE — 3700000002 HC GENERAL ANESTHESIA TIME - EACH INCREMENTAL 1 MINUTE

## 2024-04-01 PROCEDURE — 81025 URINE PREGNANCY TEST: CPT | Performed by: ANESTHESIOLOGY

## 2024-04-01 PROCEDURE — 2500000004 HC RX 250 GENERAL PHARMACY W/ HCPCS (ALT 636 FOR OP/ED): Performed by: REGISTERED NURSE

## 2024-04-01 PROCEDURE — A45385 PR COLONOSCOPY,REMV LESN,SNARE: Performed by: ANESTHESIOLOGY

## 2024-04-01 PROCEDURE — 45385 COLONOSCOPY W/LESION REMOVAL: CPT | Performed by: STUDENT IN AN ORGANIZED HEALTH CARE EDUCATION/TRAINING PROGRAM

## 2024-04-01 PROCEDURE — 7100000009 HC PHASE TWO TIME - INITIAL BASE CHARGE

## 2024-04-01 PROCEDURE — 43239 EGD BIOPSY SINGLE/MULTIPLE: CPT | Performed by: STUDENT IN AN ORGANIZED HEALTH CARE EDUCATION/TRAINING PROGRAM

## 2024-04-01 PROCEDURE — 88305 TISSUE EXAM BY PATHOLOGIST: CPT | Performed by: STUDENT IN AN ORGANIZED HEALTH CARE EDUCATION/TRAINING PROGRAM

## 2024-04-01 PROCEDURE — 88305 TISSUE EXAM BY PATHOLOGIST: CPT | Mod: TC | Performed by: STUDENT IN AN ORGANIZED HEALTH CARE EDUCATION/TRAINING PROGRAM

## 2024-04-01 PROCEDURE — 7100000010 HC PHASE TWO TIME - EACH INCREMENTAL 1 MINUTE

## 2024-04-01 PROCEDURE — 2500000005 HC RX 250 GENERAL PHARMACY W/O HCPCS: Performed by: REGISTERED NURSE

## 2024-04-01 RX ORDER — PROPOFOL 10 MG/ML
INJECTION, EMULSION INTRAVENOUS CONTINUOUS PRN
Status: DISCONTINUED | OUTPATIENT
Start: 2024-04-01 | End: 2024-04-01

## 2024-04-01 RX ORDER — MIDAZOLAM HYDROCHLORIDE 1 MG/ML
INJECTION INTRAMUSCULAR; INTRAVENOUS AS NEEDED
Status: DISCONTINUED | OUTPATIENT
Start: 2024-04-01 | End: 2024-04-01

## 2024-04-01 RX ORDER — PROPOFOL 10 MG/ML
INJECTION, EMULSION INTRAVENOUS AS NEEDED
Status: DISCONTINUED | OUTPATIENT
Start: 2024-04-01 | End: 2024-04-01

## 2024-04-01 RX ORDER — LIDOCAINE HYDROCHLORIDE 20 MG/ML
INJECTION, SOLUTION EPIDURAL; INFILTRATION; INTRACAUDAL; PERINEURAL AS NEEDED
Status: DISCONTINUED | OUTPATIENT
Start: 2024-04-01 | End: 2024-04-01

## 2024-04-01 RX ORDER — SODIUM CHLORIDE, SODIUM LACTATE, POTASSIUM CHLORIDE, CALCIUM CHLORIDE 600; 310; 30; 20 MG/100ML; MG/100ML; MG/100ML; MG/100ML
INJECTION, SOLUTION INTRAVENOUS CONTINUOUS PRN
Status: DISCONTINUED | OUTPATIENT
Start: 2024-04-01 | End: 2024-04-01

## 2024-04-01 RX ADMIN — PROPOFOL 100 MCG/KG/MIN: 10 INJECTION, EMULSION INTRAVENOUS at 12:51

## 2024-04-01 RX ADMIN — PROPOFOL 100 MG: 10 INJECTION, EMULSION INTRAVENOUS at 12:50

## 2024-04-01 RX ADMIN — LIDOCAINE HYDROCHLORIDE 100 MG: 20 INJECTION, SOLUTION EPIDURAL; INFILTRATION; INTRACAUDAL; PERINEURAL at 12:50

## 2024-04-01 RX ADMIN — SODIUM CHLORIDE, POTASSIUM CHLORIDE, SODIUM LACTATE AND CALCIUM CHLORIDE: 600; 310; 30; 20 INJECTION, SOLUTION INTRAVENOUS at 12:41

## 2024-04-01 RX ADMIN — MIDAZOLAM HYDROCHLORIDE 1 MG: 1 INJECTION INTRAMUSCULAR; INTRAVENOUS at 12:49

## 2024-04-01 RX ADMIN — MIDAZOLAM HYDROCHLORIDE 1 MG: 1 INJECTION INTRAMUSCULAR; INTRAVENOUS at 12:42

## 2024-04-01 ASSESSMENT — PAIN SCALES - GENERAL
PAINLEVEL_OUTOF10: 0 - NO PAIN

## 2024-04-01 ASSESSMENT — COLUMBIA-SUICIDE SEVERITY RATING SCALE - C-SSRS
6. HAVE YOU EVER DONE ANYTHING, STARTED TO DO ANYTHING, OR PREPARED TO DO ANYTHING TO END YOUR LIFE?: NO
1. IN THE PAST MONTH, HAVE YOU WISHED YOU WERE DEAD OR WISHED YOU COULD GO TO SLEEP AND NOT WAKE UP?: NO
2. HAVE YOU ACTUALLY HAD ANY THOUGHTS OF KILLING YOURSELF?: NO

## 2024-04-01 ASSESSMENT — PAIN - FUNCTIONAL ASSESSMENT
PAIN_FUNCTIONAL_ASSESSMENT: 0-10

## 2024-04-01 NOTE — ANESTHESIA PREPROCEDURE EVALUATION
"Patient: Mayuri Calhoun    Procedure Information       Date/Time: 04/01/24 1200    Scheduled providers: Elena Solis MD    Procedures:       EGD      COLONOSCOPY    Location: [facility]            Relevant Problems   Cardiac   (+) Hypertension      Neuro   (+) Depressive disorder   (+) Epilepsy with both generalized and focal features, intractable (CMS/HCC)   (+) Schizophrenia, unspecified type (CMS/HCC)      GI   (+) Esophageal dysphagia      ID   (+) Herpes simplex virus (HSV) infection       Clinical information reviewed:   Tobacco  Allergies  Meds   Med Hx  Surg Hx  OB Status  Fam Hx  Soc   Hx         Past Medical History:   Diagnosis Date    Anemia, unspecified     Bipolar disorder (CMS/HCC)     Epilepsy, unspecified, not intractable, without status epilepticus (CMS/HCC)     HTN (hypertension)     Major depressive disorder, recurrent, unspecified (CMS/HCC)     Migraines     Palpitations     Peripartum cardiomyopathy       Past Surgical History:   Procedure Laterality Date    HYSTEROSCOPY      LOBECTOMY FOR SEIZURE FOCUS  2012    left temporal    TUBAL LIGATION  2011    [place]     Social History     Tobacco Use    Smoking status: Every Day     Types: Cigars     Start date: 2018    Smokeless tobacco: Never   Vaping Use    Vaping Use: Never used   Substance Use Topics    Alcohol use: Not Currently     Comment: \"every once in a while\"    Drug use: Yes     Frequency: 7.0 times per week     Types: Marijuana     Comment: daily use, last smoked marijuana this AM 4/1/24      Current Outpatient Medications   Medication Instructions    acetaminophen (TYLENOL) 500 mg, oral, As needed    carBAMazepine (CARBATROL) 200 mg, oral, 2 times daily    clonazePAM (KlonoPIN) 0.5 mg disintegrating tablet TAKE 1 TABLET BY MOUTH TWO TIMES A DAY AS NEEDED (FOR SEIZURE WARNING) FOR UP  DAYS.    diphenhydrAMINE (SOMINEX) 25 mg, oral, As needed    hydroCHLOROthiazide (HYDRODIURIL) 25 mg, oral, Every 24 " "hours    ibuprofen 800 mg, oral, Every 8 hours PRN    nasal spray Nayzilam 5 mg/spray (0.1 mL) spray,non-aerosol 1 spray, nasal    pantoprazole (PROTONIX) 40 mg, oral, Daily, 30 min before 1st meal. Do not crush, chew, or split.    valACYclovir (Valtrex) 500 mg tablet TAKE 1 TABLET BY MOUTH TWICE A DAY FOR 3 DAYS      No Known Allergies     Chemistry    Lab Results   Component Value Date/Time     01/30/2024 1019    K 4.1 01/30/2024 1019     01/30/2024 1019    CO2 27 01/30/2024 1019    BUN 13 01/30/2024 1019    CREATININE 0.93 01/30/2024 1019    Lab Results   Component Value Date/Time    CALCIUM 9.6 01/30/2024 1019    ALKPHOS 63 01/30/2024 1019    AST 19 01/30/2024 1019    ALT 26 01/30/2024 1019    BILITOT 0.4 01/30/2024 1019          No results found for: \"HGBA1C\"  Lab Results   Component Value Date/Time    WBC 7.2 01/30/2024 1020    HGB 13.1 01/30/2024 1020    HCT 38.6 01/30/2024 1020     01/30/2024 1020     Lab Results   Component Value Date/Time    PROTIME 12.6 10/14/2019 0030    INR 1.1 10/14/2019 0030     No results found for: \"ABORH\"  Encounter Date: 01/30/24   ECG 12 Lead    Narrative    NSR, no abnormality     No results found for this or any previous visit from the past 1095 days.     Echo 2/12/2016@[facility]:  LEFT VENTRICLE   The left ventricle is normal in size.   Left ventricular systolic function is normal.   Baseline left ventricular diastolic function is normal.   Mitral annular lateral E/e': 7.4. Mitral annular septal E/e': 11.6.   Wall Motion:   All scored segments are normal.     RIGHT VENTRICLE   The right ventricle is normal in size.   Right ventricular systolic function is normal.   Estimated right ventricular systolic pressure is 17 mmHg consistent with normal   pulmonary artery pressures. Estimated right atrial pressure is 5 mmHg.     LEFT ATRIUM   The left atrial cavity is normal in size.   Pulmonary Veins:   The pulmonary venous pattern showed normal systolic flow.     RIGHT " "ATRIUM   The right atrial cavity is normal in size.   Inferior Vena Cava:   The inferior vena cava appears normal measuring 1.5 cm. The vessel decreases   greater than 50 percent with inspiration.     MITRAL VALVE   The mitral valve leaflets are structurally normal. There is trivial (trivial - 1+)    mitral valve regurgitation. The pressure half time is 70 msec. The peak mitral   E/A ratio is 1.18. The average mitral E/e' ratio is 9.5. The mitral flow   deceleration time is 243 msec.     TRICUSPID VALVE   The tricuspid valve leaflets are structurally normal. There is trivial (trivial -   1+) tricuspid valve regurgitation. The hepatic venous pattern showed normal   systolic flow.     AORTIC VALVE   The aortic valve cusps are structurally normal. There is no aortic valve stenosis.    There is no aortic valve regurgitation. Tricuspid aortic valve. There is no   thickening. The peak gradient is 10 mmHg (peak velocity = 156.0 cm/s).     PULMONIC VALVE   The pulmonic valve cusps are structurally normal. There is trivial pulmonic valve   regurgitation.     AORTA   The visualized aorta is normal in size.   Measurements - Sinus 3.1 cm. Mid ascending aorta 3.0 cm.     PERICARDIUM   The pericardium is normal.     CONCLUSIONS:   - Technically difficult exam due to suboptimal positioning.   - Exam indication: Hx postpartum cardiomyopathy   - The left ventricle is normal in size. Left ventricular systolic function is   normal. EF = 56 ± 5% (2D biplane) Baseline left ventricular diastolic function is   normal.   - The right ventricle is normal in size. Right ventricular systolic function is   normal.   - There are no significant valvular abnormalities.   - The patient has not had a prior [facility] echocardiographic exam for comparison.     Visit Vitals  BP 95/68   Pulse 94   Temp 36.5 °C (97.7 °F) (Temporal)   Resp 19   Ht 1.6 m (5' 3\")   Wt 57.4 kg (126 lb 8.7 oz)   LMP 03/28/2024 (Approximate)   SpO2 100%   Breastfeeding No   BMI 22.42 " kg/m²   OB Status Having periods   Smoking Status Every Day   BSA 1.6 m²     NPO/Void Status  Carbohydrate Drink Given Prior to Surgery? : N  Date of Last Liquid: 04/01/24  Time of Last Liquid: 1030  Date of Last Solid: 03/30/24  Time of Last Solid: 1800  Last Intake Type: Clear fluids (ginger ale)  Time of Last Void: 1100         Physical Exam    Airway  Mallampati: III  TM distance: >3 FB  Neck ROM: full     Cardiovascular   Rhythm: regular  Rate: normal     Dental - normal exam     Pulmonary   Breath sounds clear to auscultation     Abdominal - normal exam              Anesthesia Plan    History of general anesthesia?: yes  History of complications of general anesthesia?: no    ASA 3     MAC     intravenous induction   Anesthetic plan and risks discussed with patient.    Plan discussed with CRNA and CAA.

## 2024-04-01 NOTE — INTERVAL H&P NOTE
H&P reviewed. The patient was examined and there are no changes to the H&P.    Pre- procedure clearance obtained from cardiology and neurology and reviewed

## 2024-04-01 NOTE — DISCHARGE INSTRUCTIONS
Patient Instructions after a Colonoscopy      The anesthetics, sedatives or narcotics which were given to you today will be acting in your body for the next 24 hours, so you might feel a little sleepy or groggy.  This feeling should slowly wear off. Carefully read and follow the instructions.     You received sedation today:  - Do not drive or operate any machinery or power tools of any kind.   - No alcoholic beverages today, not even beer or wine.  - Do not make any important decisions or sign any legal documents.  - No over the counter medications that contain alcohol or that may cause drowsiness.  - Do not make any important decisions or sign any legal documents.    While it is common to experience mild to moderate abdominal distention, gas, or belching after your procedure, if any of these symptoms occur following discharge from the GI Lab or within one week of having your procedure, call the [facility] to be advised whether a visit to your nearest Urgent Care or Emergency Department is indicated.  Take this paper with you if you go.     - If you develop an allergic reaction to the medications that were given during your procedure such as difficulty breathing, rash, hives, severe nausea, vomiting or lightheadedness.  - If you experience chest pain, shortness of breath, severe abdominal pain, fevers and chills.  -If you develop signs and symptoms of bleeding such as blood in your spit, if your stools turn black, tarry, or bloody  - If you have not urinated within 8 hours following your procedure.  - If your IV site becomes painful, red, inflamed, or looks infected.    If you received a biopsy/polypectomy/sphincterotomy the following instructions apply below:    __ Do not use Aspirin containing products, non-steroidal medications or anti-coagulants for one week following your procedure. (Examples of these types of medications are: Advil, Arthrotec, Aleve, Coumadin, Ecotrin, Heparin, Ibuprofen,  Indocin, Motrin, Naprosyn, Nuprin, Plavix, Vioxx, and Voltarin, or their generic forms.  This list is not all-inclusive.  Check with your physician or pharmacist before resuming medications.)   __ Eat a soft diet today.  Avoid foods that are poorly digested for the next 24 hours.  These foods would include: nuts, beans, lettuce, red meats, and fried foods. Start with liquids and advance your diet as tolerated, gradually work up to eating solids.   __ Do not have a Barium Study or Enema for one week.    Your physician recommends the additional following instructions:    -You have a contact number available for emergencies. The signs and symptoms of potential delayed complications were discussed with you. You may return to normal activities tomorrow.  -Resume your previous diet.  -Continue your present medications.   -We are waiting for your pathology results.  -Your physician has recommended a repeat colonoscopy (date to be determined after pending pathology results are reviewed) for surveillance based on pathology results.  -The findings and recommendations have been discussed with you.  -The findings and recommendations were discussed with your family.  - Please see Medication Reconciliation Form for new medication/medications prescribed.       If you experience any problems or have any questions following discharge from the GI Lab, please call:        Nurse Signature                                                                        Date___________________                                                                            Patient/Responsible Party Signature                                        Date___________________

## 2024-04-01 NOTE — ANESTHESIA POSTPROCEDURE EVALUATION
Patient: Mayuri Calhoun    Procedure Summary       Date: 04/01/24 Room / Location: [facility]    Anesthesia Start: 1242 Anesthesia Stop: 1336    Procedures:       EGD      COLONOSCOPY Diagnosis:       Epigastric pain      Weight loss      Nausea and vomiting, unspecified vomiting type      Esophageal dysphagia      Hematochezia    Scheduled Providers: Elena Solis MD; Diya Bustos RN; Anil Ly MA Responsible Provider: Graeme Weller MD    Anesthesia Type: MAC ASA Status: 3            Anesthesia Type: MAC    Vitals Value Taken Time   /68 04/01/24 1415   Temp 36.3 °C (97.3 °F) 04/01/24 1415   Pulse 65 04/01/24 1415   Resp 16 04/01/24 1415   SpO2 100 % 04/01/24 1415       Anesthesia Post Evaluation    Patient location during evaluation: PACU  Patient participation: complete - patient participated  Level of consciousness: awake and alert  Pain management: adequate  Airway patency: patent  Cardiovascular status: acceptable and hemodynamically stable  Respiratory status: acceptable, spontaneous ventilation and nonlabored ventilation  Hydration status: acceptable  Postoperative Nausea and Vomiting: none        There were no known notable events for this encounter.

## 2024-04-05 LAB
LABORATORY COMMENT REPORT: NORMAL
PATH REPORT.FINAL DX SPEC: NORMAL
PATH REPORT.GROSS SPEC: NORMAL
PATH REPORT.TOTAL CANCER: NORMAL

## 2024-04-15 NOTE — RESULT ENCOUNTER NOTE
EGD.Colon biopsies reviewed, no e/o celiac or H.pylori, normal esophageal biopsies, random colon normal. One tubular adenoma and 3 hyperplastic polyps. No obvious cause to explain patient's symptoms. C/w PPI and fiber, limit marijuana, f.up GI  Repeat colon in 7 yrs, 3/2031

## 2024-07-03 ENCOUNTER — TELEPHONE (OUTPATIENT)
Dept: PRIMARY CARE | Facility: CLINIC | Age: 34
End: 2024-07-03
Payer: MEDICAID

## 2024-07-03 NOTE — TELEPHONE ENCOUNTER
Patient called yelling and screaming and upset. She states that the dentist informed her that she would need an letter from one of Doctors, stating that she is not in health risk and it is ok for them to do surgery to pull a tooth that has an hole in it. I did inform her that she may need to have an appoint ment with you to discuss this but I would see if this something you could do.

## 2024-07-10 ENCOUNTER — LAB REQUISITION (OUTPATIENT)
Dept: LAB | Facility: HOSPITAL | Age: 34
End: 2024-07-10
Payer: MEDICAID

## 2024-07-10 DIAGNOSIS — K02.9 DENTAL CARIES, UNSPECIFIED: ICD-10-CM

## 2024-07-10 DIAGNOSIS — N76.0 ACUTE VAGINITIS: ICD-10-CM

## 2024-07-10 DIAGNOSIS — A60.00 HERPESVIRAL INFECTION OF UROGENITAL SYSTEM, UNSPECIFIED: ICD-10-CM

## 2024-07-10 PROCEDURE — 87102 FUNGUS ISOLATION CULTURE: CPT

## 2024-07-11 DIAGNOSIS — Z00.00 ROUTINE GENERAL MEDICAL EXAMINATION AT A HEALTH CARE FACILITY: ICD-10-CM

## 2024-07-11 RX ORDER — HYDROCHLOROTHIAZIDE 25 MG/1
25 TABLET ORAL EVERY 24 HOURS
Qty: 90 TABLET | Refills: 0 | Status: SHIPPED | OUTPATIENT
Start: 2024-07-11 | End: 2024-10-09

## 2024-07-14 LAB — YEAST SPEC QL CULT: NORMAL

## 2024-10-12 DIAGNOSIS — Z00.00 ROUTINE GENERAL MEDICAL EXAMINATION AT A HEALTH CARE FACILITY: ICD-10-CM

## 2024-10-12 RX ORDER — HYDROCHLOROTHIAZIDE 25 MG/1
25 TABLET ORAL EVERY 24 HOURS
Qty: 30 TABLET | Refills: 0 | Status: SHIPPED | OUTPATIENT
Start: 2024-10-12 | End: 2024-11-11

## 2024-10-26 DIAGNOSIS — Z00.00 ROUTINE GENERAL MEDICAL EXAMINATION AT A HEALTH CARE FACILITY: ICD-10-CM

## 2024-10-26 RX ORDER — HYDROCHLOROTHIAZIDE 25 MG/1
25 TABLET ORAL EVERY 24 HOURS
Qty: 90 TABLET | Refills: 1 | OUTPATIENT
Start: 2024-10-26 | End: 2024-11-25

## 2024-10-29 NOTE — TELEPHONE ENCOUNTER
Called patient's phone number, got a message that the call could not be accepted at this time. Will try to call again

## 2024-11-04 RX ORDER — HYDROCHLOROTHIAZIDE 25 MG/1
25 TABLET ORAL EVERY 24 HOURS
Qty: 30 TABLET | Refills: 0 | Status: SHIPPED | OUTPATIENT
Start: 2024-11-04 | End: 2024-12-04

## 2024-11-26 ENCOUNTER — APPOINTMENT (OUTPATIENT)
Dept: PRIMARY CARE | Facility: CLINIC | Age: 34
End: 2024-11-26
Payer: COMMERCIAL

## 2024-12-26 ENCOUNTER — APPOINTMENT (OUTPATIENT)
Dept: PRIMARY CARE | Facility: CLINIC | Age: 34
End: 2024-12-26
Payer: COMMERCIAL

## 2024-12-26 VITALS
HEART RATE: 91 BPM | SYSTOLIC BLOOD PRESSURE: 112 MMHG | WEIGHT: 139.4 LBS | OXYGEN SATURATION: 100 % | BODY MASS INDEX: 24.69 KG/M2 | TEMPERATURE: 97.4 F | DIASTOLIC BLOOD PRESSURE: 70 MMHG

## 2024-12-26 DIAGNOSIS — B00.9 HERPES: ICD-10-CM

## 2024-12-26 DIAGNOSIS — G40.804 EPILEPSY WITH BOTH GENERALIZED AND FOCAL FEATURES, INTRACTABLE (MULTI): ICD-10-CM

## 2024-12-26 DIAGNOSIS — N30.00 ACUTE CYSTITIS WITHOUT HEMATURIA: ICD-10-CM

## 2024-12-26 DIAGNOSIS — I10 PRIMARY HYPERTENSION: Primary | ICD-10-CM

## 2024-12-26 DIAGNOSIS — Z00.00 ROUTINE GENERAL MEDICAL EXAMINATION AT A HEALTH CARE FACILITY: ICD-10-CM

## 2024-12-26 PROCEDURE — 87086 URINE CULTURE/COLONY COUNT: CPT

## 2024-12-26 PROCEDURE — 3078F DIAST BP <80 MM HG: CPT | Performed by: FAMILY MEDICINE

## 2024-12-26 PROCEDURE — 99214 OFFICE O/P EST MOD 30 MIN: CPT | Performed by: FAMILY MEDICINE

## 2024-12-26 PROCEDURE — 3074F SYST BP LT 130 MM HG: CPT | Performed by: FAMILY MEDICINE

## 2024-12-26 RX ORDER — HYDROCHLOROTHIAZIDE 25 MG/1
25 TABLET ORAL EVERY 24 HOURS
Qty: 90 TABLET | Refills: 1 | Status: SHIPPED | OUTPATIENT
Start: 2024-12-26 | End: 2025-06-24

## 2024-12-26 RX ORDER — AMOXICILLIN AND CLAVULANATE POTASSIUM 875; 125 MG/1; MG/1
875 TABLET, FILM COATED ORAL 2 TIMES DAILY
Qty: 20 TABLET | Refills: 0 | Status: SHIPPED | OUTPATIENT
Start: 2024-12-26 | End: 2025-01-05

## 2024-12-26 RX ORDER — VALACYCLOVIR HYDROCHLORIDE 500 MG/1
500 TABLET, FILM COATED ORAL 2 TIMES DAILY
Qty: 14 TABLET | Refills: 5 | Status: SHIPPED | OUTPATIENT
Start: 2024-12-26 | End: 2025-02-06

## 2024-12-26 ASSESSMENT — ENCOUNTER SYMPTOMS
DEPRESSION: 0
OCCASIONAL FEELINGS OF UNSTEADINESS: 0
LOSS OF SENSATION IN FEET: 0

## 2024-12-26 ASSESSMENT — PATIENT HEALTH QUESTIONNAIRE - PHQ9
2. FEELING DOWN, DEPRESSED OR HOPELESS: NOT AT ALL
1. LITTLE INTEREST OR PLEASURE IN DOING THINGS: NOT AT ALL
SUM OF ALL RESPONSES TO PHQ9 QUESTIONS 1 AND 2: 0

## 2024-12-26 ASSESSMENT — PAIN SCALES - GENERAL: PAINLEVEL_OUTOF10: 0-NO PAIN

## 2024-12-26 NOTE — PROGRESS NOTES
Subjective   Patient ID: Mayuri Calhoun is a 34 y.o. female who presents for Med Management.  Patient is here to follow up on medications, which she brings with her.  Says she had planned to come in sooner, but has been busy with work and school.  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    Keeps getting UTI, she thinks, from seizure medication.  Augmentin has been given previously.  Feels like she has one again.  Is using OTC medication that discolors the urine.  Hasn't seeing neurologist in a year, although she knows she needs to.  Has not had any seizures lately, but sometimes feels like will go into one, but if keeps active she avoids it.  Will get pain in left of head that she feels is a sign one is coming.    Seeing cardiologist for heart, but hasn't seen for a while.  Had low pulse they were evaluating.  She notices when she gets anxious, she will feel like her heart is racing, but not happening a lot.    Has had a lot of problems with social security and disability issues.  Since she is working and going to school, they are not giving her as much.          Review of Systems    Objective   /70 (BP Location: Right arm, Patient Position: Sitting, BP Cuff Size: Adult)   Pulse 91   Temp 36.3 °C (97.4 °F) (Temporal)   Wt 63.2 kg (139 lb 6.4 oz)   SpO2 100%   BMI 24.69 kg/m²     Physical Exam  Vitals reviewed.   Constitutional:       Appearance: Normal appearance.   Cardiovascular:      Rate and Rhythm: Normal rate and regular rhythm.      Heart sounds: No murmur heard.  Pulmonary:      Effort: Pulmonary effort is normal.      Breath sounds: Normal breath sounds.   Musculoskeletal:      Right lower leg: No edema.      Left lower leg: No edema.   Neurological:      Mental Status: She is alert. Mental status is at baseline.   Psychiatric:         Mood and Affect: Mood normal.         Behavior: Behavior normal.           Assessment/Plan   Problem List Items Addressed This Visit        Epilepsy with both generalized and focal features, intractable (Multi)    Relevant Orders    Comprehensive Metabolic Panel    Magnesium    TSH with reflex to Free T4 if abnormal    CBC    Hypertension - Primary    Relevant Orders    Lipid Panel     Other Visit Diagnoses       Herpes        Relevant Medications    valACYclovir (Valtrex) 500 mg tablet    Routine general medical examination at a health care facility        Relevant Medications    hydroCHLOROthiazide (HYDRODiuril) 25 mg tablet    Acute cystitis without hematuria        Relevant Medications    amoxicillin-pot clavulanate (Augmentin) 875-125 mg tablet    Other Relevant Orders    Urine Culture    Body mass index (BMI) of 24.0 to 24.9 in adult

## 2024-12-26 NOTE — PATIENT INSTRUCTIONS
BP controlled.  Continue hydrochlorothiazide 25 mg.    Heart racing periodically.  Had seen cardiologist, and due to follow up.    Continue with neurologist for seizures.  (Due to have an appointment)  Currently taking carbamazepine 200 mg twice a day.    For GI symptoms taking pantoprazole.  Had seen gastroenterologist.    Frequent UTI's with current symptoms.  Check urine culture.  In the meantime, will start Augmentin, which had been given previously.    Check fasting blood work some morning.  Fast for 12 hours prior to having blood drawn.  You should drink plenty of water, and can have black tea or black coffee, if you'd like.

## 2024-12-27 LAB — BACTERIA UR CULT: NO GROWTH

## 2025-06-21 ENCOUNTER — APPOINTMENT (OUTPATIENT)
Dept: RADIOLOGY | Facility: HOSPITAL | Age: 35
End: 2025-06-21
Payer: MEDICARE

## 2025-06-21 ENCOUNTER — CLINICAL SUPPORT (OUTPATIENT)
Dept: EMERGENCY MEDICINE | Facility: HOSPITAL | Age: 35
End: 2025-06-21
Payer: MEDICARE

## 2025-06-21 ENCOUNTER — HOSPITAL ENCOUNTER (EMERGENCY)
Facility: HOSPITAL | Age: 35
Discharge: HOME | End: 2025-06-21
Attending: STUDENT IN AN ORGANIZED HEALTH CARE EDUCATION/TRAINING PROGRAM
Payer: MEDICARE

## 2025-06-21 VITALS
HEART RATE: 68 BPM | TEMPERATURE: 98.2 F | HEART RATE: 68 BPM | OXYGEN SATURATION: 100 % | RESPIRATION RATE: 18 BRPM | SYSTOLIC BLOOD PRESSURE: 119 MMHG | OXYGEN SATURATION: 100 % | SYSTOLIC BLOOD PRESSURE: 119 MMHG | TEMPERATURE: 98.2 F | RESPIRATION RATE: 18 BRPM | WEIGHT: 120 LBS | HEIGHT: 63 IN | WEIGHT: 120 LBS | DIASTOLIC BLOOD PRESSURE: 82 MMHG | DIASTOLIC BLOOD PRESSURE: 82 MMHG | BODY MASS INDEX: 21.26 KG/M2 | BODY MASS INDEX: 21.26 KG/M2 | HEIGHT: 63 IN

## 2025-06-21 DIAGNOSIS — R56.9 SEIZURES (MULTI): Primary | ICD-10-CM

## 2025-06-21 LAB
ABO GROUP (TYPE) IN BLOOD: NORMAL
ABO GROUP (TYPE) IN BLOOD: NORMAL
ALBUMIN SERPL BCP-MCNC: 4.3 G/DL (ref 3.4–5)
ALBUMIN SERPL BCP-MCNC: 4.3 G/DL (ref 3.4–5)
ALP SERPL-CCNC: 73 U/L (ref 33–110)
ALP SERPL-CCNC: 73 U/L (ref 33–110)
ALT SERPL W P-5'-P-CCNC: 23 U/L (ref 7–45)
ALT SERPL W P-5'-P-CCNC: 23 U/L (ref 7–45)
ANION GAP BLDV CALCULATED.4IONS-SCNC: 10 MMOL/L (ref 10–25)
ANION GAP BLDV CALCULATED.4IONS-SCNC: 10 MMOL/L (ref 10–25)
ANION GAP SERPL CALC-SCNC: 11 MMOL/L (ref 10–20)
ANION GAP SERPL CALC-SCNC: 11 MMOL/L (ref 10–20)
ANTIBODY SCREEN: NORMAL
ANTIBODY SCREEN: NORMAL
APAP SERPL-MCNC: <10 UG/ML (ref ?–30)
APAP SERPL-MCNC: <10 UG/ML (ref ?–30)
AST SERPL W P-5'-P-CCNC: 26 U/L (ref 9–39)
AST SERPL W P-5'-P-CCNC: 26 U/L (ref 9–39)
B-HCG SERPL-ACNC: <3 MIU/ML
B-HCG SERPL-ACNC: <3 MIU/ML
BASE EXCESS BLDV CALC-SCNC: 3.3 MMOL/L (ref -2–3)
BASE EXCESS BLDV CALC-SCNC: 3.3 MMOL/L (ref -2–3)
BASOPHILS # BLD AUTO: 0.03 X10*3/UL (ref 0–0.1)
BASOPHILS # BLD AUTO: 0.03 X10*3/UL (ref 0–0.1)
BASOPHILS NFR BLD AUTO: 0.5 %
BASOPHILS NFR BLD AUTO: 0.5 %
BILIRUB SERPL-MCNC: 0.4 MG/DL (ref 0–1.2)
BILIRUB SERPL-MCNC: 0.4 MG/DL (ref 0–1.2)
BODY TEMPERATURE: 37 DEGREES CELSIUS
BODY TEMPERATURE: 37 DEGREES CELSIUS
BUN SERPL-MCNC: 11 MG/DL (ref 6–23)
BUN SERPL-MCNC: 11 MG/DL (ref 6–23)
CA-I BLDV-SCNC: 1.16 MMOL/L (ref 1.1–1.33)
CA-I BLDV-SCNC: 1.16 MMOL/L (ref 1.1–1.33)
CALCIUM SERPL-MCNC: 8.9 MG/DL (ref 8.6–10.6)
CALCIUM SERPL-MCNC: 8.9 MG/DL (ref 8.6–10.6)
CHLORIDE BLDV-SCNC: 100 MMOL/L (ref 98–107)
CHLORIDE BLDV-SCNC: 100 MMOL/L (ref 98–107)
CHLORIDE SERPL-SCNC: 98 MMOL/L (ref 98–107)
CHLORIDE SERPL-SCNC: 98 MMOL/L (ref 98–107)
CO2 SERPL-SCNC: 26 MMOL/L (ref 21–32)
CO2 SERPL-SCNC: 26 MMOL/L (ref 21–32)
CREAT SERPL-MCNC: 1.06 MG/DL (ref 0.5–1.05)
CREAT SERPL-MCNC: 1.06 MG/DL (ref 0.5–1.05)
EGFRCR SERPLBLD CKD-EPI 2021: 70 ML/MIN/1.73M*2
EGFRCR SERPLBLD CKD-EPI 2021: 70 ML/MIN/1.73M*2
EOSINOPHIL # BLD AUTO: 0.13 X10*3/UL (ref 0–0.7)
EOSINOPHIL # BLD AUTO: 0.13 X10*3/UL (ref 0–0.7)
EOSINOPHIL NFR BLD AUTO: 2.3 %
EOSINOPHIL NFR BLD AUTO: 2.3 %
ERYTHROCYTE [DISTWIDTH] IN BLOOD BY AUTOMATED COUNT: 12.1 % (ref 11.5–14.5)
ERYTHROCYTE [DISTWIDTH] IN BLOOD BY AUTOMATED COUNT: 12.1 % (ref 11.5–14.5)
ETHANOL SERPL-MCNC: <10 MG/DL
FIBRINOGEN PPP-MCNC: 278 MG/DL (ref 200–400)
FIBRINOGEN PPP-MCNC: 278 MG/DL (ref 200–400)
GLUCOSE BLDV-MCNC: 81 MG/DL (ref 74–99)
GLUCOSE BLDV-MCNC: 81 MG/DL (ref 74–99)
GLUCOSE SERPL-MCNC: 82 MG/DL (ref 74–99)
GLUCOSE SERPL-MCNC: 82 MG/DL (ref 74–99)
HCO3 BLDV-SCNC: 27.8 MMOL/L (ref 22–26)
HCO3 BLDV-SCNC: 27.8 MMOL/L (ref 22–26)
HCT VFR BLD AUTO: 31.5 % (ref 36–46)
HCT VFR BLD AUTO: 31.5 % (ref 36–46)
HCT VFR BLD EST: 37 % (ref 36–46)
HCT VFR BLD EST: 37 % (ref 36–46)
HGB BLD-MCNC: 11.8 G/DL (ref 12–16)
HGB BLD-MCNC: 11.8 G/DL (ref 12–16)
HGB BLDV-MCNC: 12.4 G/DL (ref 12–16)
HGB BLDV-MCNC: 12.4 G/DL (ref 12–16)
IMM GRANULOCYTES # BLD AUTO: 0.02 X10*3/UL (ref 0–0.7)
IMM GRANULOCYTES # BLD AUTO: 0.02 X10*3/UL (ref 0–0.7)
IMM GRANULOCYTES NFR BLD AUTO: 0.4 % (ref 0–0.9)
IMM GRANULOCYTES NFR BLD AUTO: 0.4 % (ref 0–0.9)
INR PPP: 1 (ref 0.9–1.1)
INR PPP: 1 (ref 0.9–1.1)
LACTATE BLDV-SCNC: 1.2 MMOL/L (ref 0.4–2)
LACTATE BLDV-SCNC: 1.2 MMOL/L (ref 0.4–2)
LACTATE SERPL-SCNC: 1.3 MMOL/L (ref 0.4–2)
LACTATE SERPL-SCNC: 1.3 MMOL/L (ref 0.4–2)
LYMPHOCYTES # BLD AUTO: 2.73 X10*3/UL (ref 1.2–4.8)
LYMPHOCYTES # BLD AUTO: 2.73 X10*3/UL (ref 1.2–4.8)
LYMPHOCYTES NFR BLD AUTO: 48.9 %
LYMPHOCYTES NFR BLD AUTO: 48.9 %
MCH RBC QN AUTO: 33.4 PG (ref 26–34)
MCH RBC QN AUTO: 33.4 PG (ref 26–34)
MCHC RBC AUTO-ENTMCNC: 37.5 G/DL (ref 32–36)
MCHC RBC AUTO-ENTMCNC: 37.5 G/DL (ref 32–36)
MCV RBC AUTO: 89 FL (ref 80–100)
MCV RBC AUTO: 89 FL (ref 80–100)
MONOCYTES # BLD AUTO: 0.32 X10*3/UL (ref 0.1–1)
MONOCYTES # BLD AUTO: 0.32 X10*3/UL (ref 0.1–1)
MONOCYTES NFR BLD AUTO: 5.7 %
MONOCYTES NFR BLD AUTO: 5.7 %
NEUTROPHILS # BLD AUTO: 2.35 X10*3/UL (ref 1.2–7.7)
NEUTROPHILS # BLD AUTO: 2.35 X10*3/UL (ref 1.2–7.7)
NEUTROPHILS NFR BLD AUTO: 42.2 %
NEUTROPHILS NFR BLD AUTO: 42.2 %
NRBC BLD-RTO: 0 /100 WBCS (ref 0–0)
NRBC BLD-RTO: 0 /100 WBCS (ref 0–0)
OXYHGB MFR BLDV: 83.1 % (ref 45–75)
OXYHGB MFR BLDV: 83.1 % (ref 45–75)
PCO2 BLDV: 41 MM HG (ref 41–51)
PCO2 BLDV: 41 MM HG (ref 41–51)
PH BLDV: 7.44 PH (ref 7.33–7.43)
PH BLDV: 7.44 PH (ref 7.33–7.43)
PLATELET # BLD AUTO: 178 X10*3/UL (ref 150–450)
PLATELET # BLD AUTO: 178 X10*3/UL (ref 150–450)
PO2 BLDV: 58 MM HG (ref 35–45)
PO2 BLDV: 58 MM HG (ref 35–45)
POTASSIUM BLDV-SCNC: 4.5 MMOL/L (ref 3.5–5.3)
POTASSIUM BLDV-SCNC: 4.5 MMOL/L (ref 3.5–5.3)
POTASSIUM SERPL-SCNC: 4.2 MMOL/L (ref 3.5–5.3)
POTASSIUM SERPL-SCNC: 4.2 MMOL/L (ref 3.5–5.3)
PROT SERPL-MCNC: 7.1 G/DL (ref 6.4–8.2)
PROT SERPL-MCNC: 7.1 G/DL (ref 6.4–8.2)
PROTHROMBIN TIME: 11.5 SECONDS (ref 9.8–12.4)
PROTHROMBIN TIME: 11.5 SECONDS (ref 9.8–12.4)
RBC # BLD AUTO: 3.53 X10*6/UL (ref 4–5.2)
RBC # BLD AUTO: 3.53 X10*6/UL (ref 4–5.2)
RH FACTOR (ANTIGEN D): NORMAL
RH FACTOR (ANTIGEN D): NORMAL
SALICYLATES SERPL-MCNC: <3 MG/DL (ref ?–20)
SALICYLATES SERPL-MCNC: <3 MG/DL (ref ?–20)
SAO2 % BLDV: 91 % (ref 45–75)
SAO2 % BLDV: 91 % (ref 45–75)
SODIUM BLDV-SCNC: 133 MMOL/L (ref 136–145)
SODIUM BLDV-SCNC: 133 MMOL/L (ref 136–145)
SODIUM SERPL-SCNC: 131 MMOL/L (ref 136–145)
SODIUM SERPL-SCNC: 131 MMOL/L (ref 136–145)
VALPROATE SERPL-MCNC: <10 UG/ML (ref 50–100)
VALPROATE SERPL-MCNC: <10 UG/ML (ref 50–100)
WBC # BLD AUTO: 5.6 X10*3/UL (ref 4.4–11.3)
WBC # BLD AUTO: 5.6 X10*3/UL (ref 4.4–11.3)

## 2025-06-21 PROCEDURE — 70450 CT HEAD/BRAIN W/O DYE: CPT | Performed by: RADIOLOGY

## 2025-06-21 PROCEDURE — 80143 DRUG ASSAY ACETAMINOPHEN: CPT

## 2025-06-21 PROCEDURE — 86900 BLOOD TYPING SEROLOGIC ABO: CPT

## 2025-06-21 PROCEDURE — 84132 ASSAY OF SERUM POTASSIUM: CPT | Mod: 59

## 2025-06-21 PROCEDURE — 83605 ASSAY OF LACTIC ACID: CPT

## 2025-06-21 PROCEDURE — 36415 COLL VENOUS BLD VENIPUNCTURE: CPT

## 2025-06-21 PROCEDURE — 2500000001 HC RX 250 WO HCPCS SELF ADMINISTERED DRUGS (ALT 637 FOR MEDICARE OP)

## 2025-06-21 PROCEDURE — 93005 ELECTROCARDIOGRAM TRACING: CPT

## 2025-06-21 PROCEDURE — 99285 EMERGENCY DEPT VISIT HI MDM: CPT | Mod: 25 | Performed by: STUDENT IN AN ORGANIZED HEALTH CARE EDUCATION/TRAINING PROGRAM

## 2025-06-21 PROCEDURE — 71045 X-RAY EXAM CHEST 1 VIEW: CPT | Performed by: STUDENT IN AN ORGANIZED HEALTH CARE EDUCATION/TRAINING PROGRAM

## 2025-06-21 PROCEDURE — 93010 ELECTROCARDIOGRAM REPORT: CPT | Performed by: STUDENT IN AN ORGANIZED HEALTH CARE EDUCATION/TRAINING PROGRAM

## 2025-06-21 PROCEDURE — 85384 FIBRINOGEN ACTIVITY: CPT

## 2025-06-21 PROCEDURE — 84702 CHORIONIC GONADOTROPIN TEST: CPT

## 2025-06-21 PROCEDURE — 80053 COMPREHEN METABOLIC PANEL: CPT

## 2025-06-21 PROCEDURE — G0390 TRAUMA RESPONS W/HOSP CRITI: HCPCS

## 2025-06-21 PROCEDURE — 2550000001 HC RX 255 CONTRASTS: Performed by: STUDENT IN AN ORGANIZED HEALTH CARE EDUCATION/TRAINING PROGRAM

## 2025-06-21 PROCEDURE — 2500000004 HC RX 250 GENERAL PHARMACY W/ HCPCS (ALT 636 FOR OP/ED)

## 2025-06-21 PROCEDURE — 80320 DRUG SCREEN QUANTALCOHOLS: CPT

## 2025-06-21 PROCEDURE — 71045 X-RAY EXAM CHEST 1 VIEW: CPT

## 2025-06-21 PROCEDURE — 80164 ASSAY DIPROPYLACETIC ACD TOT: CPT | Performed by: PHYSICIAN ASSISTANT

## 2025-06-21 PROCEDURE — 84132 ASSAY OF SERUM POTASSIUM: CPT

## 2025-06-21 PROCEDURE — 71260 CT THORAX DX C+: CPT

## 2025-06-21 PROCEDURE — 73090 X-RAY EXAM OF FOREARM: CPT | Mod: LEFT SIDE | Performed by: STUDENT IN AN ORGANIZED HEALTH CARE EDUCATION/TRAINING PROGRAM

## 2025-06-21 PROCEDURE — 72125 CT NECK SPINE W/O DYE: CPT | Performed by: RADIOLOGY

## 2025-06-21 PROCEDURE — 74177 CT ABD & PELVIS W/CONTRAST: CPT | Performed by: STUDENT IN AN ORGANIZED HEALTH CARE EDUCATION/TRAINING PROGRAM

## 2025-06-21 PROCEDURE — 85025 COMPLETE CBC W/AUTO DIFF WBC: CPT

## 2025-06-21 PROCEDURE — 72128 CT CHEST SPINE W/O DYE: CPT | Performed by: RADIOLOGY

## 2025-06-21 PROCEDURE — 70450 CT HEAD/BRAIN W/O DYE: CPT

## 2025-06-21 PROCEDURE — 82077 ASSAY SPEC XCP UR&BREATH IA: CPT

## 2025-06-21 PROCEDURE — 84295 ASSAY OF SERUM SODIUM: CPT

## 2025-06-21 PROCEDURE — 85610 PROTHROMBIN TIME: CPT

## 2025-06-21 PROCEDURE — 71260 CT THORAX DX C+: CPT | Performed by: STUDENT IN AN ORGANIZED HEALTH CARE EDUCATION/TRAINING PROGRAM

## 2025-06-21 PROCEDURE — 72125 CT NECK SPINE W/O DYE: CPT

## 2025-06-21 PROCEDURE — 96374 THER/PROPH/DIAG INJ IV PUSH: CPT | Mod: 59

## 2025-06-21 PROCEDURE — 72131 CT LUMBAR SPINE W/O DYE: CPT | Performed by: RADIOLOGY

## 2025-06-21 PROCEDURE — 82330 ASSAY OF CALCIUM: CPT

## 2025-06-21 PROCEDURE — 72170 X-RAY EXAM OF PELVIS: CPT

## 2025-06-21 PROCEDURE — 86901 BLOOD TYPING SEROLOGIC RH(D): CPT

## 2025-06-21 PROCEDURE — 73090 X-RAY EXAM OF FOREARM: CPT | Mod: LT

## 2025-06-21 PROCEDURE — 72170 X-RAY EXAM OF PELVIS: CPT | Performed by: STUDENT IN AN ORGANIZED HEALTH CARE EDUCATION/TRAINING PROGRAM

## 2025-06-21 PROCEDURE — 99285 EMERGENCY DEPT VISIT HI MDM: CPT | Performed by: STUDENT IN AN ORGANIZED HEALTH CARE EDUCATION/TRAINING PROGRAM

## 2025-06-21 PROCEDURE — 86850 RBC ANTIBODY SCREEN: CPT

## 2025-06-21 PROCEDURE — 74177 CT ABD & PELVIS W/CONTRAST: CPT

## 2025-06-21 RX ORDER — CARBAMAZEPINE 200 MG/1
200 CAPSULE, EXTENDED RELEASE ORAL 2 TIMES DAILY
Qty: 60 CAPSULE | Refills: 0 | Status: SHIPPED | OUTPATIENT
Start: 2025-06-21 | End: 2026-06-21

## 2025-06-21 RX ORDER — ACETAMINOPHEN 325 MG/1
975 TABLET ORAL ONCE
Status: COMPLETED | OUTPATIENT
Start: 2025-06-21 | End: 2025-06-21

## 2025-06-21 RX ORDER — METOCLOPRAMIDE HYDROCHLORIDE 5 MG/ML
10 INJECTION INTRAMUSCULAR; INTRAVENOUS ONCE
Status: COMPLETED | OUTPATIENT
Start: 2025-06-21 | End: 2025-06-21

## 2025-06-21 RX ADMIN — METOCLOPRAMIDE 10 MG: 5 INJECTION, SOLUTION INTRAMUSCULAR; INTRAVENOUS at 17:42

## 2025-06-21 RX ADMIN — IOHEXOL 75 ML: 350 INJECTION, SOLUTION INTRAVENOUS at 16:42

## 2025-06-21 RX ADMIN — ACETAMINOPHEN 975 MG: 325 TABLET ORAL at 17:42

## 2025-06-21 ASSESSMENT — LIFESTYLE VARIABLES
HAVE YOU EVER FELT YOU SHOULD CUT DOWN ON YOUR DRINKING: NO
HAVE PEOPLE ANNOYED YOU BY CRITICIZING YOUR DRINKING: NO
TOTAL SCORE: 0
EVER FELT BAD OR GUILTY ABOUT YOUR DRINKING: NO
EVER HAD A DRINK FIRST THING IN THE MORNING TO STEADY YOUR NERVES TO GET RID OF A HANGOVER: NO

## 2025-06-21 ASSESSMENT — ENCOUNTER SYMPTOMS
SHORTNESS OF BREATH: 0
DIZZINESS: 0
VOMITING: 0
AGITATION: 0
BACK PAIN: 1
FLANK PAIN: 0
NAUSEA: 0
WEAKNESS: 0
NECK PAIN: 0
CONFUSION: 0
CHILLS: 0
WHEEZING: 0
SEIZURES: 1
FEVER: 0
HEADACHES: 0
ABDOMINAL DISTENTION: 0
ABDOMINAL PAIN: 1

## 2025-06-21 ASSESSMENT — PAIN DESCRIPTION - PROGRESSION: CLINICAL_PROGRESSION: NOT CHANGED

## 2025-06-21 ASSESSMENT — PAIN SCALES - GENERAL
PAINLEVEL_OUTOF10: 5 - MODERATE PAIN
PAINLEVEL_OUTOF10: 0 - NO PAIN

## 2025-06-21 ASSESSMENT — PAIN - FUNCTIONAL ASSESSMENT: PAIN_FUNCTIONAL_ASSESSMENT: 0-10
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
## 2025-06-21 NOTE — DISCHARGE INSTRUCTIONS
You were found to have right thyroid  lobe nodule. Please follow up with your primary care physician for a further evaluation. If you do not have a primary care doctor you [phone] to [facility].    Seizure Precautions:     - DO NOT drive, use power tools, operate heavy machinery, or be on ladders     - DO NOT use the shower, not the bath     - refrain from ANY activity which could result in injury from loss-of-consciousness      - These restrictions should continue until instructed by a doctor to do otherwise.
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
## 2025-06-24 ENCOUNTER — OFFICE VISIT (OUTPATIENT)
Dept: PRIMARY CARE | Facility: CLINIC | Age: 35
End: 2025-06-24
Payer: COMMERCIAL

## 2025-06-24 VITALS
DIASTOLIC BLOOD PRESSURE: 80 MMHG | HEART RATE: 111 BPM | TEMPERATURE: 97.4 F | WEIGHT: 134 LBS | SYSTOLIC BLOOD PRESSURE: 110 MMHG | OXYGEN SATURATION: 98 % | BODY MASS INDEX: 23.74 KG/M2 | HEIGHT: 63 IN

## 2025-06-24 DIAGNOSIS — S16.1XXD STRAIN OF NECK MUSCLE, SUBSEQUENT ENCOUNTER: ICD-10-CM

## 2025-06-24 DIAGNOSIS — S80.11XD CONTUSION OF RIGHT KNEE AND LOWER LEG, SUBSEQUENT ENCOUNTER: ICD-10-CM

## 2025-06-24 DIAGNOSIS — S39.012D STRAIN OF LUMBAR REGION, SUBSEQUENT ENCOUNTER: ICD-10-CM

## 2025-06-24 DIAGNOSIS — M54.9 ACUTE BILATERAL BACK PAIN, UNSPECIFIED BACK LOCATION: Primary | ICD-10-CM

## 2025-06-24 DIAGNOSIS — S80.12XD CONTUSION OF LEFT KNEE AND LOWER LEG, SUBSEQUENT ENCOUNTER: ICD-10-CM

## 2025-06-24 DIAGNOSIS — S80.02XD CONTUSION OF LEFT KNEE AND LOWER LEG, SUBSEQUENT ENCOUNTER: ICD-10-CM

## 2025-06-24 DIAGNOSIS — S80.01XD CONTUSION OF RIGHT KNEE AND LOWER LEG, SUBSEQUENT ENCOUNTER: ICD-10-CM

## 2025-06-24 DIAGNOSIS — S29.019D THORACIC MYOFASCIAL STRAIN, SUBSEQUENT ENCOUNTER: ICD-10-CM

## 2025-06-24 PROCEDURE — 3008F BODY MASS INDEX DOCD: CPT | Performed by: FAMILY MEDICINE

## 2025-06-24 PROCEDURE — 3079F DIAST BP 80-89 MM HG: CPT | Performed by: FAMILY MEDICINE

## 2025-06-24 PROCEDURE — 99214 OFFICE O/P EST MOD 30 MIN: CPT | Performed by: FAMILY MEDICINE

## 2025-06-24 PROCEDURE — 3074F SYST BP LT 130 MM HG: CPT | Performed by: FAMILY MEDICINE

## 2025-06-24 RX ORDER — IBUPROFEN 600 MG/1
600 TABLET, FILM COATED ORAL EVERY 8 HOURS PRN
Qty: 30 TABLET | Refills: 0 | Status: SHIPPED | OUTPATIENT
Start: 2025-06-24

## 2025-06-24 RX ORDER — IBUPROFEN 800 MG/1
800 TABLET, FILM COATED ORAL EVERY 8 HOURS PRN
Status: CANCELLED | OUTPATIENT
Start: 2025-06-24

## 2025-06-24 RX ORDER — CYCLOBENZAPRINE HCL 5 MG
5 TABLET ORAL NIGHTLY PRN
Qty: 14 TABLET | Refills: 0 | Status: SHIPPED | OUTPATIENT
Start: 2025-06-24 | End: 2025-07-08

## 2025-06-24 ASSESSMENT — PATIENT HEALTH QUESTIONNAIRE - PHQ9
1. LITTLE INTEREST OR PLEASURE IN DOING THINGS: NOT AT ALL
2. FEELING DOWN, DEPRESSED OR HOPELESS: NOT AT ALL
SUM OF ALL RESPONSES TO PHQ9 QUESTIONS 1 AND 2: 0

## 2025-06-24 ASSESSMENT — ENCOUNTER SYMPTOMS
DEPRESSION: 0
LOSS OF SENSATION IN FEET: 0
OCCASIONAL FEELINGS OF UNSTEADINESS: 0
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
## 2025-06-25 RX ORDER — ACETAMINOPHEN 500 MG
500 TABLET ORAL AS NEEDED
Qty: 30 TABLET | Refills: 1 | Status: SHIPPED | OUTPATIENT
Start: 2025-06-25
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
## 2025-06-26 ENCOUNTER — APPOINTMENT (OUTPATIENT)
Dept: PRIMARY CARE | Facility: CLINIC | Age: 35
End: 2025-06-26
Payer: COMMERCIAL

## 2025-08-25 ENCOUNTER — TELEPHONE (OUTPATIENT)
Dept: PHYSICAL THERAPY | Facility: HOSPITAL | Age: 35
End: 2025-08-25
Payer: COMMERCIAL